# Patient Record
Sex: FEMALE | Race: WHITE | NOT HISPANIC OR LATINO
[De-identification: names, ages, dates, MRNs, and addresses within clinical notes are randomized per-mention and may not be internally consistent; named-entity substitution may affect disease eponyms.]

---

## 2017-02-09 PROBLEM — Z00.00 ENCOUNTER FOR PREVENTIVE HEALTH EXAMINATION: Noted: 2017-02-09

## 2017-02-13 ENCOUNTER — MEDICATION RENEWAL (OUTPATIENT)
Age: 47
End: 2017-02-13

## 2017-02-13 ENCOUNTER — APPOINTMENT (OUTPATIENT)
Dept: PULMONOLOGY | Facility: CLINIC | Age: 47
End: 2017-02-13

## 2017-02-13 VITALS
BODY MASS INDEX: 26.58 KG/M2 | HEIGHT: 63 IN | SYSTOLIC BLOOD PRESSURE: 126 MMHG | WEIGHT: 150 LBS | HEART RATE: 77 BPM | DIASTOLIC BLOOD PRESSURE: 78 MMHG | TEMPERATURE: 98 F | OXYGEN SATURATION: 100 %

## 2017-02-13 DIAGNOSIS — Z78.9 OTHER SPECIFIED HEALTH STATUS: ICD-10-CM

## 2017-02-13 DIAGNOSIS — Z86.32 PERSONAL HISTORY OF GESTATIONAL DIABETES: ICD-10-CM

## 2017-02-13 DIAGNOSIS — Z82.69 FAMILY HISTORY OF OTHER DISEASES OF THE MUSCULOSKELETAL SYSTEM AND CONNECTIVE TISSUE: ICD-10-CM

## 2017-02-13 DIAGNOSIS — Z87.09 PERSONAL HISTORY OF OTHER DISEASES OF THE RESPIRATORY SYSTEM: ICD-10-CM

## 2017-02-13 DIAGNOSIS — F17.200 NICOTINE DEPENDENCE, UNSPECIFIED, UNCOMPLICATED: ICD-10-CM

## 2017-02-13 DIAGNOSIS — Z87.39 PERSONAL HISTORY OF OTHER DISEASES OF THE MUSCULOSKELETAL SYSTEM AND CONNECTIVE TISSUE: ICD-10-CM

## 2017-02-13 DIAGNOSIS — M50.20 OTHER CERVICAL DISC DISPLACEMENT, UNSPECIFIED CERVICAL REGION: ICD-10-CM

## 2017-02-13 DIAGNOSIS — Z82.61 FAMILY HISTORY OF ARTHRITIS: ICD-10-CM

## 2017-02-13 PROBLEM — Z00.00 ENCOUNTER FOR PREVENTIVE HEALTH EXAMINATION: Noted: 2017-02-13

## 2017-02-13 RX ORDER — ALBUTEROL 90 MCG
90 AEROSOL (GRAM) INHALATION
Refills: 0 | Status: ACTIVE | COMMUNITY

## 2017-02-13 RX ORDER — MONTELUKAST SODIUM 10 MG/1
10 TABLET, FILM COATED ORAL
Qty: 1 | Refills: 1 | Status: ACTIVE | COMMUNITY
Start: 2017-02-13 | End: 1900-01-01

## 2017-04-24 ENCOUNTER — APPOINTMENT (OUTPATIENT)
Dept: PULMONOLOGY | Facility: CLINIC | Age: 47
End: 2017-04-24

## 2017-04-24 VITALS
HEART RATE: 77 BPM | SYSTOLIC BLOOD PRESSURE: 110 MMHG | HEIGHT: 63 IN | BODY MASS INDEX: 26.58 KG/M2 | WEIGHT: 150 LBS | DIASTOLIC BLOOD PRESSURE: 80 MMHG | RESPIRATION RATE: 17 BRPM | OXYGEN SATURATION: 98 %

## 2017-05-31 ENCOUNTER — EMERGENCY (EMERGENCY)
Facility: HOSPITAL | Age: 47
LOS: 1 days | Discharge: HOME | End: 2017-05-31
Attending: EMERGENCY MEDICINE | Admitting: EMERGENCY MEDICINE
Payer: COMMERCIAL

## 2017-05-31 VITALS
SYSTOLIC BLOOD PRESSURE: 138 MMHG | RESPIRATION RATE: 20 BRPM | HEIGHT: 63 IN | TEMPERATURE: 98 F | DIASTOLIC BLOOD PRESSURE: 90 MMHG | OXYGEN SATURATION: 99 % | HEART RATE: 66 BPM

## 2017-05-31 VITALS
SYSTOLIC BLOOD PRESSURE: 122 MMHG | DIASTOLIC BLOOD PRESSURE: 79 MMHG | TEMPERATURE: 98 F | OXYGEN SATURATION: 97 % | HEART RATE: 79 BPM | RESPIRATION RATE: 20 BRPM

## 2017-05-31 DIAGNOSIS — Z98.891 HISTORY OF UTERINE SCAR FROM PREVIOUS SURGERY: Chronic | ICD-10-CM

## 2017-05-31 LAB
ALBUMIN SERPL ELPH-MCNC: 4 G/DL — SIGNIFICANT CHANGE UP (ref 3.3–5)
ALP SERPL-CCNC: 65 U/L — SIGNIFICANT CHANGE UP (ref 40–120)
ALT FLD-CCNC: 12 U/L RC — SIGNIFICANT CHANGE UP (ref 10–45)
ANION GAP SERPL CALC-SCNC: 14 MMOL/L — SIGNIFICANT CHANGE UP (ref 5–17)
APTT BLD: 30 SEC — SIGNIFICANT CHANGE UP (ref 27.5–37.4)
AST SERPL-CCNC: 13 U/L — SIGNIFICANT CHANGE UP (ref 10–40)
BASOPHILS # BLD AUTO: 0.1 K/UL — SIGNIFICANT CHANGE UP (ref 0–0.2)
BASOPHILS NFR BLD AUTO: 0.8 % — SIGNIFICANT CHANGE UP (ref 0–2)
BILIRUB SERPL-MCNC: <0.1 MG/DL — LOW (ref 0.2–1.2)
BUN SERPL-MCNC: 11 MG/DL — SIGNIFICANT CHANGE UP (ref 7–23)
CALCIUM SERPL-MCNC: 9.2 MG/DL — SIGNIFICANT CHANGE UP (ref 8.4–10.5)
CHLORIDE SERPL-SCNC: 103 MMOL/L — SIGNIFICANT CHANGE UP (ref 96–108)
CK MB CFR SERPL CALC: 1.1 NG/ML — SIGNIFICANT CHANGE UP (ref 0–3.8)
CK SERPL-CCNC: 80 U/L — SIGNIFICANT CHANGE UP (ref 25–170)
CO2 SERPL-SCNC: 23 MMOL/L — SIGNIFICANT CHANGE UP (ref 22–31)
CREAT SERPL-MCNC: 0.87 MG/DL — SIGNIFICANT CHANGE UP (ref 0.5–1.3)
EOSINOPHIL # BLD AUTO: 0.3 K/UL — SIGNIFICANT CHANGE UP (ref 0–0.5)
EOSINOPHIL NFR BLD AUTO: 2.2 % — SIGNIFICANT CHANGE UP (ref 0–6)
GLUCOSE SERPL-MCNC: 108 MG/DL — HIGH (ref 70–99)
HCT VFR BLD CALC: 38.1 % — SIGNIFICANT CHANGE UP (ref 34.5–45)
HGB BLD-MCNC: 12.5 G/DL — SIGNIFICANT CHANGE UP (ref 11.5–15.5)
INR BLD: 1.01 RATIO — SIGNIFICANT CHANGE UP (ref 0.88–1.16)
LYMPHOCYTES # BLD AUTO: 28.6 % — SIGNIFICANT CHANGE UP (ref 13–44)
LYMPHOCYTES # BLD AUTO: 3.2 K/UL — SIGNIFICANT CHANGE UP (ref 1–3.3)
MCHC RBC-ENTMCNC: 28.5 PG — SIGNIFICANT CHANGE UP (ref 27–34)
MCHC RBC-ENTMCNC: 32.9 GM/DL — SIGNIFICANT CHANGE UP (ref 32–36)
MCV RBC AUTO: 86.4 FL — SIGNIFICANT CHANGE UP (ref 80–100)
MONOCYTES # BLD AUTO: 0.7 K/UL — SIGNIFICANT CHANGE UP (ref 0–0.9)
MONOCYTES NFR BLD AUTO: 6 % — SIGNIFICANT CHANGE UP (ref 2–14)
NEUTROPHILS # BLD AUTO: 7.1 K/UL — SIGNIFICANT CHANGE UP (ref 1.8–7.4)
NEUTROPHILS NFR BLD AUTO: 62.4 % — SIGNIFICANT CHANGE UP (ref 43–77)
PLATELET # BLD AUTO: 250 K/UL — SIGNIFICANT CHANGE UP (ref 150–400)
POTASSIUM SERPL-MCNC: 3.7 MMOL/L — SIGNIFICANT CHANGE UP (ref 3.5–5.3)
POTASSIUM SERPL-SCNC: 3.7 MMOL/L — SIGNIFICANT CHANGE UP (ref 3.5–5.3)
PROT SERPL-MCNC: 6.8 G/DL — SIGNIFICANT CHANGE UP (ref 6–8.3)
PROTHROM AB SERPL-ACNC: 11 SEC — SIGNIFICANT CHANGE UP (ref 9.8–12.7)
RBC # BLD: 4.4 M/UL — SIGNIFICANT CHANGE UP (ref 3.8–5.2)
RBC # FLD: 13 % — SIGNIFICANT CHANGE UP (ref 10.3–14.5)
SODIUM SERPL-SCNC: 140 MMOL/L — SIGNIFICANT CHANGE UP (ref 135–145)
TROPONIN T SERPL-MCNC: <0.01 NG/ML — SIGNIFICANT CHANGE UP (ref 0–0.06)
TROPONIN T SERPL-MCNC: <0.01 NG/ML — SIGNIFICANT CHANGE UP (ref 0–0.06)
WBC # BLD: 11.3 K/UL — HIGH (ref 3.8–10.5)
WBC # FLD AUTO: 11.3 K/UL — HIGH (ref 3.8–10.5)

## 2017-05-31 PROCEDURE — 85027 COMPLETE CBC AUTOMATED: CPT

## 2017-05-31 PROCEDURE — 99285 EMERGENCY DEPT VISIT HI MDM: CPT | Mod: 25

## 2017-05-31 PROCEDURE — 93010 ELECTROCARDIOGRAM REPORT: CPT

## 2017-05-31 PROCEDURE — 85730 THROMBOPLASTIN TIME PARTIAL: CPT

## 2017-05-31 PROCEDURE — 85610 PROTHROMBIN TIME: CPT

## 2017-05-31 PROCEDURE — 93005 ELECTROCARDIOGRAM TRACING: CPT

## 2017-05-31 PROCEDURE — 99284 EMERGENCY DEPT VISIT MOD MDM: CPT | Mod: 25

## 2017-05-31 PROCEDURE — 71046 X-RAY EXAM CHEST 2 VIEWS: CPT

## 2017-05-31 PROCEDURE — 84484 ASSAY OF TROPONIN QUANT: CPT

## 2017-05-31 PROCEDURE — 80053 COMPREHEN METABOLIC PANEL: CPT

## 2017-05-31 PROCEDURE — 96374 THER/PROPH/DIAG INJ IV PUSH: CPT

## 2017-05-31 PROCEDURE — 71020: CPT | Mod: 26

## 2017-05-31 PROCEDURE — 82553 CREATINE MB FRACTION: CPT

## 2017-05-31 PROCEDURE — 82550 ASSAY OF CK (CPK): CPT

## 2017-05-31 RX ORDER — ASPIRIN/CALCIUM CARB/MAGNESIUM 324 MG
162 TABLET ORAL ONCE
Qty: 0 | Refills: 0 | Status: DISCONTINUED | OUTPATIENT
Start: 2017-05-31 | End: 2017-06-04

## 2017-05-31 RX ORDER — KETOROLAC TROMETHAMINE 30 MG/ML
15 SYRINGE (ML) INJECTION ONCE
Qty: 0 | Refills: 0 | Status: DISCONTINUED | OUTPATIENT
Start: 2017-05-31 | End: 2017-05-31

## 2017-05-31 RX ORDER — SODIUM CHLORIDE 9 MG/ML
3 INJECTION INTRAMUSCULAR; INTRAVENOUS; SUBCUTANEOUS ONCE
Qty: 0 | Refills: 0 | Status: COMPLETED | OUTPATIENT
Start: 2017-05-31 | End: 2017-05-31

## 2017-05-31 RX ADMIN — SODIUM CHLORIDE 3 MILLILITER(S): 9 INJECTION INTRAMUSCULAR; INTRAVENOUS; SUBCUTANEOUS at 01:32

## 2017-05-31 RX ADMIN — Medication 15 MILLIGRAM(S): at 02:24

## 2017-05-31 NOTE — ED PROVIDER NOTE - PHYSICAL EXAMINATION
Constitutional: Alert, oriented, NAD  Eyes: PERRLA EOMI  HEENT: MMM, oropharynx non-erythematous, no exudate  Head: Normocephalic atraumatic  Cardiac: RRR, S1/S2, no MRG  Resp: Lungs CTAB  GI: Abd s/nt/nd, no rebound or guard  Neuro: CN2-12 intact  Skin: No rashes

## 2017-05-31 NOTE — ED ADULT NURSE NOTE - OBJECTIVE STATEMENT
47 y f came to the ed with right sided chest pain. patient states getting over a cold where she was coughing often. denies any sob. states the sob began around 4pm suddenly. denies any aggravating or relieving factors. skin is warm and dry. denies fevers, chills. lung sounds are clear bilaterally.

## 2017-05-31 NOTE — ED PROVIDER NOTE - ATTENDING CONTRIBUTION TO CARE
47F with pmh of asthma presents with R-sided chest pain, sudden onset while at rest. Continues to have discomfort. EKG with no acute changes. PERC negative.   plan to eval for acs with blood work, ekg, cxr. plan for two trop and cards f/u.

## 2017-05-31 NOTE — ED PROVIDER NOTE - CARE PLAN
Principal Discharge DX:	Chest pain Principal Discharge DX:	Chest pain  Instructions for follow-up, activity and diet:	Please follow-up with cardiology clinic within the next 2-3 days, please see form. Take aspirin, 81 mg once a day. Return immediately with any worsening or concerning symptoms.

## 2017-05-31 NOTE — ED PROVIDER NOTE - PLAN OF CARE
Please follow-up with cardiology clinic within the next 2-3 days, please see form. Take aspirin, 81 mg once a day. Return immediately with any worsening or concerning symptoms.

## 2017-05-31 NOTE — ED PROVIDER NOTE - NS ED ROS FT
Constitutional: No fever or chills  Eyes: No visual changes  HEENT: nasal congestion  CV: Chest pain  Resp: No SOB, cough  GI: No abd pain, nause or vomiting  : No dysuria  MSK: No musculoskeletal pain  Skin: No rash  Neuro: No headache

## 2017-05-31 NOTE — ED PROVIDER NOTE - MEDICAL DECISION MAKING DETAILS
47F with pmh of asthma presents with R-sided chest pain, sudden onset while at rest. Continues to have discomfort. EKG with no acute changes. PERC negative. HEART score with mod suspicious story 2 with neg troponin. Will delta trop, have f/u with cardiology if repeat troponin neg. - César Bashir MD

## 2017-05-31 NOTE — ED PROVIDER NOTE - OBJECTIVE STATEMENT
47F with pmh of asthma presents with R-sided chest pain beginning this afternoon. pt recently with URI symptoms, with cough, nasal congestion, x 1 1/2 weeks .cough productive of light yellow mucous. chest pain R pectoral region, constant, "sharp," improves somewhat with putting pressure on chest. non radiating. 8/10. pleuritic. worse with any movement. no sob. pt took tums without improvement in symptoms. no f/c. no n/v/d. no family hx of cardiac disease. no pain or swelling of calves. no GONZALEZ.

## 2017-08-16 ENCOUNTER — APPOINTMENT (OUTPATIENT)
Dept: PULMONOLOGY | Facility: CLINIC | Age: 47
End: 2017-08-16
Payer: COMMERCIAL

## 2017-08-16 VITALS
DIASTOLIC BLOOD PRESSURE: 65 MMHG | RESPIRATION RATE: 15 BRPM | HEIGHT: 63 IN | WEIGHT: 154 LBS | OXYGEN SATURATION: 100 % | BODY MASS INDEX: 27.29 KG/M2 | SYSTOLIC BLOOD PRESSURE: 120 MMHG | HEART RATE: 75 BPM

## 2017-08-16 PROCEDURE — 94010 BREATHING CAPACITY TEST: CPT

## 2017-08-16 PROCEDURE — 99214 OFFICE O/P EST MOD 30 MIN: CPT | Mod: 25

## 2017-08-16 PROCEDURE — 94727 GAS DIL/WSHOT DETER LNG VOL: CPT

## 2017-08-16 PROCEDURE — 94729 DIFFUSING CAPACITY: CPT

## 2017-09-07 ENCOUNTER — MEDICATION RENEWAL (OUTPATIENT)
Age: 47
End: 2017-09-07

## 2017-10-25 ENCOUNTER — RX RENEWAL (OUTPATIENT)
Age: 47
End: 2017-10-25

## 2017-12-13 ENCOUNTER — APPOINTMENT (OUTPATIENT)
Dept: PULMONOLOGY | Facility: CLINIC | Age: 47
End: 2017-12-13
Payer: COMMERCIAL

## 2017-12-13 VITALS
SYSTOLIC BLOOD PRESSURE: 90 MMHG | WEIGHT: 155 LBS | HEIGHT: 63 IN | RESPIRATION RATE: 14 BRPM | HEART RATE: 77 BPM | OXYGEN SATURATION: 100 % | DIASTOLIC BLOOD PRESSURE: 70 MMHG | BODY MASS INDEX: 27.46 KG/M2

## 2017-12-13 PROCEDURE — 94010 BREATHING CAPACITY TEST: CPT

## 2017-12-13 PROCEDURE — 99406 BEHAV CHNG SMOKING 3-10 MIN: CPT

## 2017-12-13 PROCEDURE — 99214 OFFICE O/P EST MOD 30 MIN: CPT | Mod: 25

## 2017-12-13 PROCEDURE — 99070 SPECIAL SUPPLIES PHYS/QHP: CPT

## 2018-03-27 ENCOUNTER — APPOINTMENT (OUTPATIENT)
Dept: PULMONOLOGY | Facility: CLINIC | Age: 48
End: 2018-03-27
Payer: COMMERCIAL

## 2018-03-27 VITALS
SYSTOLIC BLOOD PRESSURE: 126 MMHG | DIASTOLIC BLOOD PRESSURE: 70 MMHG | HEART RATE: 67 BPM | WEIGHT: 155 LBS | HEIGHT: 63 IN | OXYGEN SATURATION: 96 % | BODY MASS INDEX: 27.46 KG/M2

## 2018-03-27 PROCEDURE — 94618 PULMONARY STRESS TESTING: CPT

## 2018-03-27 PROCEDURE — 99214 OFFICE O/P EST MOD 30 MIN: CPT | Mod: 25

## 2018-03-27 PROCEDURE — 71046 X-RAY EXAM CHEST 2 VIEWS: CPT

## 2018-03-27 RX ORDER — ALBUTEROL SULFATE 2.5 MG/3ML
(2.5 MG/3ML) SOLUTION RESPIRATORY (INHALATION)
Qty: 120 | Refills: 5 | Status: ACTIVE | COMMUNITY
Start: 2018-03-27 | End: 1900-01-01

## 2018-03-27 RX ORDER — DAPSONE 50 MG/G
5 GEL TOPICAL
Qty: 60 | Refills: 0 | Status: ACTIVE | COMMUNITY
Start: 2018-03-26

## 2018-05-18 ENCOUNTER — RX RENEWAL (OUTPATIENT)
Age: 48
End: 2018-05-18

## 2018-05-18 RX ORDER — ALBUTEROL SULFATE 90 UG/1
108 (90 BASE) AEROSOL, METERED RESPIRATORY (INHALATION)
Qty: 8.5 | Refills: 1 | Status: ACTIVE | COMMUNITY
Start: 2016-12-28 | End: 1900-01-01

## 2018-06-26 ENCOUNTER — EMERGENCY (EMERGENCY)
Facility: HOSPITAL | Age: 48
LOS: 1 days | Discharge: HOME | End: 2018-06-26
Attending: EMERGENCY MEDICINE
Payer: COMMERCIAL

## 2018-06-26 ENCOUNTER — APPOINTMENT (OUTPATIENT)
Dept: PULMONOLOGY | Facility: CLINIC | Age: 48
End: 2018-06-26

## 2018-06-26 VITALS
SYSTOLIC BLOOD PRESSURE: 133 MMHG | WEIGHT: 153 LBS | TEMPERATURE: 98 F | HEART RATE: 86 BPM | OXYGEN SATURATION: 99 % | RESPIRATION RATE: 20 BRPM | DIASTOLIC BLOOD PRESSURE: 75 MMHG

## 2018-06-26 VITALS
OXYGEN SATURATION: 100 % | HEART RATE: 60 BPM | RESPIRATION RATE: 16 BRPM | SYSTOLIC BLOOD PRESSURE: 103 MMHG | DIASTOLIC BLOOD PRESSURE: 64 MMHG

## 2018-06-26 DIAGNOSIS — Z98.891 HISTORY OF UTERINE SCAR FROM PREVIOUS SURGERY: Chronic | ICD-10-CM

## 2018-06-26 LAB
ALBUMIN SERPL ELPH-MCNC: 4.4 G/DL — SIGNIFICANT CHANGE UP (ref 3.3–5)
ALP SERPL-CCNC: 75 U/L — SIGNIFICANT CHANGE UP (ref 40–120)
ALT FLD-CCNC: 11 U/L — SIGNIFICANT CHANGE UP (ref 10–45)
ANION GAP SERPL CALC-SCNC: 14 MMOL/L — SIGNIFICANT CHANGE UP (ref 5–17)
AST SERPL-CCNC: 11 U/L — SIGNIFICANT CHANGE UP (ref 10–40)
BASOPHILS # BLD AUTO: 0.1 K/UL — SIGNIFICANT CHANGE UP (ref 0–0.2)
BASOPHILS NFR BLD AUTO: 0.7 % — SIGNIFICANT CHANGE UP (ref 0–2)
BILIRUB SERPL-MCNC: 0.2 MG/DL — SIGNIFICANT CHANGE UP (ref 0.2–1.2)
BUN SERPL-MCNC: 10 MG/DL — SIGNIFICANT CHANGE UP (ref 7–23)
CALCIUM SERPL-MCNC: 9.7 MG/DL — SIGNIFICANT CHANGE UP (ref 8.4–10.5)
CHLORIDE SERPL-SCNC: 103 MMOL/L — SIGNIFICANT CHANGE UP (ref 96–108)
CO2 SERPL-SCNC: 24 MMOL/L — SIGNIFICANT CHANGE UP (ref 22–31)
CREAT SERPL-MCNC: 0.77 MG/DL — SIGNIFICANT CHANGE UP (ref 0.5–1.3)
EOSINOPHIL # BLD AUTO: 0.2 K/UL — SIGNIFICANT CHANGE UP (ref 0–0.5)
EOSINOPHIL NFR BLD AUTO: 2 % — SIGNIFICANT CHANGE UP (ref 0–6)
GLUCOSE SERPL-MCNC: 111 MG/DL — HIGH (ref 70–99)
HCT VFR BLD CALC: 41 % — SIGNIFICANT CHANGE UP (ref 34.5–45)
HGB BLD-MCNC: 13.7 G/DL — SIGNIFICANT CHANGE UP (ref 11.5–15.5)
LYMPHOCYTES # BLD AUTO: 2.6 K/UL — SIGNIFICANT CHANGE UP (ref 1–3.3)
LYMPHOCYTES # BLD AUTO: 25.6 % — SIGNIFICANT CHANGE UP (ref 13–44)
MCHC RBC-ENTMCNC: 29.1 PG — SIGNIFICANT CHANGE UP (ref 27–34)
MCHC RBC-ENTMCNC: 33.4 GM/DL — SIGNIFICANT CHANGE UP (ref 32–36)
MCV RBC AUTO: 87 FL — SIGNIFICANT CHANGE UP (ref 80–100)
MONOCYTES # BLD AUTO: 0.6 K/UL — SIGNIFICANT CHANGE UP (ref 0–0.9)
MONOCYTES NFR BLD AUTO: 6.2 % — SIGNIFICANT CHANGE UP (ref 2–14)
NEUTROPHILS # BLD AUTO: 6.7 K/UL — SIGNIFICANT CHANGE UP (ref 1.8–7.4)
NEUTROPHILS NFR BLD AUTO: 65.5 % — SIGNIFICANT CHANGE UP (ref 43–77)
PLATELET # BLD AUTO: 236 K/UL — SIGNIFICANT CHANGE UP (ref 150–400)
POTASSIUM SERPL-MCNC: 3.6 MMOL/L — SIGNIFICANT CHANGE UP (ref 3.5–5.3)
POTASSIUM SERPL-SCNC: 3.6 MMOL/L — SIGNIFICANT CHANGE UP (ref 3.5–5.3)
PROT SERPL-MCNC: 7.2 G/DL — SIGNIFICANT CHANGE UP (ref 6–8.3)
RBC # BLD: 4.71 M/UL — SIGNIFICANT CHANGE UP (ref 3.8–5.2)
RBC # FLD: 12.9 % — SIGNIFICANT CHANGE UP (ref 10.3–14.5)
SODIUM SERPL-SCNC: 141 MMOL/L — SIGNIFICANT CHANGE UP (ref 135–145)
TROPONIN T, HIGH SENSITIVITY RESULT: <6 NG/L — SIGNIFICANT CHANGE UP (ref 0–51)
WBC # BLD: 10.2 K/UL — SIGNIFICANT CHANGE UP (ref 3.8–10.5)
WBC # FLD AUTO: 10.2 K/UL — SIGNIFICANT CHANGE UP (ref 3.8–10.5)

## 2018-06-26 PROCEDURE — 93010 ELECTROCARDIOGRAM REPORT: CPT

## 2018-06-26 PROCEDURE — 96374 THER/PROPH/DIAG INJ IV PUSH: CPT

## 2018-06-26 PROCEDURE — 84484 ASSAY OF TROPONIN QUANT: CPT

## 2018-06-26 PROCEDURE — 85027 COMPLETE CBC AUTOMATED: CPT

## 2018-06-26 PROCEDURE — 80053 COMPREHEN METABOLIC PANEL: CPT

## 2018-06-26 PROCEDURE — 99284 EMERGENCY DEPT VISIT MOD MDM: CPT | Mod: 25

## 2018-06-26 PROCEDURE — 93005 ELECTROCARDIOGRAM TRACING: CPT

## 2018-06-26 RX ORDER — ACETAMINOPHEN 500 MG
1000 TABLET ORAL ONCE
Qty: 0 | Refills: 0 | Status: COMPLETED | OUTPATIENT
Start: 2018-06-26 | End: 2018-06-26

## 2018-06-26 RX ORDER — SODIUM CHLORIDE 9 MG/ML
1000 INJECTION INTRAMUSCULAR; INTRAVENOUS; SUBCUTANEOUS ONCE
Qty: 0 | Refills: 0 | Status: COMPLETED | OUTPATIENT
Start: 2018-06-26 | End: 2018-06-26

## 2018-06-26 RX ADMIN — Medication 400 MILLIGRAM(S): at 21:31

## 2018-06-26 RX ADMIN — SODIUM CHLORIDE 1000 MILLILITER(S): 9 INJECTION INTRAMUSCULAR; INTRAVENOUS; SUBCUTANEOUS at 21:31

## 2018-06-26 NOTE — ED ADULT NURSE NOTE - CHPI ED SYMPTOMS NEG
no blurred vision/no confusion/no numbness/no fever/no vomiting/no change in level of consciousness/no loss of consciousness

## 2018-06-26 NOTE — ED PROVIDER NOTE - OBJECTIVE STATEMENT
47 yo F, current everyday smoker, hx cervical disc herniation, presenting with c/o dizziness, headache and gait instability. Pt reports episode  dizziness with sudden onset upon standing from desk at work yesterday that she describes as light headedness- denies room/head spinning, with associated nausea, "heaviness" in head and difficulty walking straight. Pt states she attempted to walk to restroom but went straight back to desk and put her head down for 1.5 hrs with continued symptoms, and associated R hand and arm tingling, denies cp/sob at that time. Pt reports intermittent b/l frontal headache in the last two days, no provoking factors, did not take meds for alleviation. No history of migraines, pt states she has had more severe headaches than this in the past. One episode of dizziness while driving today.

## 2018-06-26 NOTE — ED PROVIDER NOTE - NS ED ROS FT
CONST: no fevers, no chills, no trauma  EYES: no pain, no visual disturbances  ENT: no sore throat, no epistaxis, no rhinorrhea, no hearing changes  CV: no chest pain, no palpitations, no orthopnea, no extremity pain or swelling  RESP: no shortness of breath, no cough, no sputum, no pleurisy, no wheezing  ABD: no abdominal pain, no nausea, no vomiting, no diarrhea, no black or bloody stool  : no dysuria, no hematuria, no frequency, no urgency  MSK: no back pain, no neck pain, no extremity pain  NEURO: +headache, dizziness  HEME: no easy bleeding or bruising  SKIN: no diaphoresis, no rash
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
## 2018-06-26 NOTE — ED PROVIDER NOTE - PROGRESS NOTE DETAILS
Dakota, PGY1: pt reports continued dizziness, now with headache, no cp/sob/palpitations, reports increased bleeding 20 minutes ago when she went to restroom. Discussion to transfuse, will consent pt reports improved symptoms, u preg and d/c

## 2018-06-26 NOTE — ED PROVIDER NOTE - ATTENDING CONTRIBUTION TO CARE
Patient presenting for evaluation of feeling lightheaded with standing yesterday, associated with nausea at that time (resolved), heavy sensation of the head (but not pain).  Improved with putting her head down on her desk.  Felt off balance at the time.  Symptoms resolved during the day yesterday, had repeat episode today (now resolved) - presenting for evaluation.    On exam patient well appearing, vital signs within normal limits, RRR S1/S2, lungs clear to ascultation bilaterally, neurologically intact, ambulating without difficulty.    Patient presentation seems more consistent with near syncope/orthostasis, normal neurologic exam makes central cause of symptoms seems less likely, plan for screening labs, EKG, CE, re-evaluate.

## 2018-06-26 NOTE — ED PROVIDER NOTE - MEDICAL DECISION MAKING DETAILS
47 yo F smoker, cervical disc hernation presenting s/p pre-syncopal episode with gait ataxia, unremarkable neuro exam, ekg, cbc, cmp, trop, orthostatics, and reassess, likely d/c with neuro f/u

## 2018-06-28 ENCOUNTER — APPOINTMENT (OUTPATIENT)
Dept: NEUROLOGY | Facility: CLINIC | Age: 48
End: 2018-06-28

## 2018-08-23 ENCOUNTER — RX RENEWAL (OUTPATIENT)
Age: 48
End: 2018-08-23

## 2018-09-25 ENCOUNTER — RX RENEWAL (OUTPATIENT)
Age: 48
End: 2018-09-25

## 2018-10-02 ENCOUNTER — APPOINTMENT (OUTPATIENT)
Dept: PULMONOLOGY | Facility: CLINIC | Age: 48
End: 2018-10-02
Payer: COMMERCIAL

## 2018-10-02 VITALS
BODY MASS INDEX: 28 KG/M2 | DIASTOLIC BLOOD PRESSURE: 70 MMHG | HEIGHT: 63 IN | RESPIRATION RATE: 17 BRPM | WEIGHT: 158 LBS | SYSTOLIC BLOOD PRESSURE: 116 MMHG | OXYGEN SATURATION: 98 % | HEART RATE: 85 BPM

## 2018-10-02 PROBLEM — M54.5 LOW BACK PAIN: Chronic | Status: ACTIVE | Noted: 2017-05-31

## 2018-10-02 PROBLEM — J45.909 UNSPECIFIED ASTHMA, UNCOMPLICATED: Chronic | Status: ACTIVE | Noted: 2017-05-31

## 2018-10-02 PROBLEM — M50.20 OTHER CERVICAL DISC DISPLACEMENT, UNSPECIFIED CERVICAL REGION: Chronic | Status: ACTIVE | Noted: 2017-05-31

## 2018-10-02 PROCEDURE — 99213 OFFICE O/P EST LOW 20 MIN: CPT

## 2018-10-17 ENCOUNTER — APPOINTMENT (OUTPATIENT)
Dept: PULMONOLOGY | Facility: CLINIC | Age: 48
End: 2018-10-17
Payer: COMMERCIAL

## 2018-10-17 ENCOUNTER — NON-APPOINTMENT (OUTPATIENT)
Age: 48
End: 2018-10-17

## 2018-10-17 VITALS
DIASTOLIC BLOOD PRESSURE: 60 MMHG | OXYGEN SATURATION: 98 % | BODY MASS INDEX: 28 KG/M2 | WEIGHT: 158 LBS | HEIGHT: 63 IN | SYSTOLIC BLOOD PRESSURE: 110 MMHG | RESPIRATION RATE: 17 BRPM | HEART RATE: 82 BPM

## 2018-10-17 PROCEDURE — 94010 BREATHING CAPACITY TEST: CPT

## 2018-10-17 PROCEDURE — 99213 OFFICE O/P EST LOW 20 MIN: CPT | Mod: 25

## 2018-10-17 RX ORDER — CETIRIZINE HYDROCHLORIDE 10 MG/1
10 TABLET ORAL
Qty: 30 | Refills: 0 | Status: ACTIVE | COMMUNITY
Start: 2018-10-17 | End: 1900-01-01

## 2018-11-13 ENCOUNTER — APPOINTMENT (OUTPATIENT)
Dept: PULMONOLOGY | Facility: CLINIC | Age: 48
End: 2018-11-13
Payer: COMMERCIAL

## 2018-11-13 ENCOUNTER — NON-APPOINTMENT (OUTPATIENT)
Age: 48
End: 2018-11-13

## 2018-11-13 VITALS
SYSTOLIC BLOOD PRESSURE: 130 MMHG | OXYGEN SATURATION: 97 % | HEART RATE: 79 BPM | DIASTOLIC BLOOD PRESSURE: 80 MMHG | BODY MASS INDEX: 28.7 KG/M2 | HEIGHT: 63 IN | WEIGHT: 162 LBS

## 2018-11-13 PROCEDURE — 94010 BREATHING CAPACITY TEST: CPT

## 2018-11-13 PROCEDURE — 99214 OFFICE O/P EST MOD 30 MIN: CPT | Mod: 25

## 2018-11-13 RX ORDER — AZITHROMYCIN 250 MG/1
250 TABLET, FILM COATED ORAL
Qty: 6 | Refills: 0 | Status: DISCONTINUED | COMMUNITY
Start: 2018-03-27 | End: 2018-11-13

## 2018-11-13 RX ORDER — AZITHROMYCIN 250 MG/1
250 TABLET, FILM COATED ORAL
Qty: 1 | Refills: 0 | Status: DISCONTINUED | COMMUNITY
Start: 2018-10-02 | End: 2018-11-13

## 2018-11-13 RX ORDER — PREDNISONE 10 MG/1
10 TABLET ORAL
Qty: 21 | Refills: 0 | Status: DISCONTINUED | COMMUNITY
Start: 2018-10-02 | End: 2018-11-13

## 2018-11-13 RX ORDER — LEVOCETIRIZINE DIHYDROCHLORIDE 5 MG/1
5 TABLET ORAL
Qty: 1 | Refills: 1 | Status: ACTIVE | COMMUNITY
Start: 2018-11-13 | End: 1900-01-01

## 2018-11-13 RX ORDER — FLUTICASONE FUROATE, UMECLIDINIUM BROMIDE AND VILANTEROL TRIFENATATE 100; 62.5; 25 UG/1; UG/1; UG/1
100-62.5-25 POWDER RESPIRATORY (INHALATION)
Qty: 3 | Refills: 1 | Status: ACTIVE | COMMUNITY
Start: 2018-11-13 | End: 1900-01-01

## 2018-11-13 RX ORDER — FLUTICASONE FUROATE AND VILANTEROL TRIFENATATE 200; 25 UG/1; UG/1
200-25 POWDER RESPIRATORY (INHALATION) DAILY
Qty: 1 | Refills: 3 | Status: DISCONTINUED | COMMUNITY
Start: 2017-02-13 | End: 2018-11-13

## 2018-11-13 RX ORDER — PREDNISONE 20 MG/1
20 TABLET ORAL
Qty: 10 | Refills: 0 | Status: DISCONTINUED | COMMUNITY
Start: 2018-03-27 | End: 2018-11-13

## 2018-11-13 NOTE — HISTORY OF PRESENT ILLNESS
[FreeTextEntry1] : Ms. Mars is a 48 year old female presenting to the office for a follow up visit s/p asthmatic bronchitis, allergic rhinitis, asthma, nicotine addiction, and shortness of breath. Her chief complaint is lingering cough\par - She continues to cough though it has improved. She reports that it has always been with her. She describes it as a lingering cough.\par - She produced slightly discolored sputum (yellow) in the morning. \par - She states that she has cut down to half a pack of cigarettes \par - She denies any headaches, nausea, vomiting, fever, chills, sweats, chest pain, chest pressure, palpitations, SOB, coughing, wheezing, fatigue, diarrhea, constipation, dysphagia, myalgias, dizziness, leg swelling, leg pain, itchy eyes, itchy ears, heartburn, reflux, or sour taste in the mouth.

## 2018-11-13 NOTE — PHYSICAL EXAM

## 2018-11-13 NOTE — PROCEDURE
[FreeTextEntry1] : PFT- spi reveals normal flows; FEV1 was 2.50L which is 91% of predicted; normal flow volume loop

## 2018-11-13 NOTE — ASSESSMENT
[FreeTextEntry1] : Ms. Mars has a history of coasthma, allergy, and GERD. She is currently recovering from an acute bronchitis asthmatic bronchitis and is still smoking (noncompliant).\par \par Her shortness of breath is multifactorial due to:\par -moderate persistent asthma\par -poor breathing mechanics\par -deconditioning \par -overweight\par \par Problem 1: moderate persistent asthma/ Co Asthma\par - Continue Albuterol nebulizer at least BID up to QiD (gargle and spit after use)\par - Transition to Trelegy 1 inhalation QD\par -continue to use Ventolin PRN and before exercise\par -continue to use Singulair 10 mg before bed \par -Asthma is believed to be caused by inherited (genetic) and environmental factor, but its exact cause is unknown. Asthma may be triggered by allergens, lung infections, or irritants in the air. Asthma triggers are different for each person\par -Inhaler technique reviewed as well as oral hygiene techniques reviewed with patient. Avoidance of cold air, extremes of temperature, rescue inhaler should be used before exercise. Order of medication reviewed with patient. Recommended use of a cool mist humidifier in the bedroom.\par \par problem 2: smoking cessation\par -acapella device for mucus clearance  \par -Discussed for five minutes with the patient the risks/associations with continued smoking including COPD, emphysema, shortness of breath, renal cancer, bladder cancer, stroke risk, cardiac disease, etc. Smoking cessation was discussed at length and highly encouraged. Various options to aid cessation was discussed including use of Chantix, Nicotrol, nicotine products, laser therapy, hypnosis, Wellbutrin, etc.\par problem 3: allergic rhinitis\par -continue to use nasal saline PRN\par -continue Xyzal 5 mg before bed \par -Environmental measures for allergies were encouraged including mattress and pillow cover, air purifier, and environmental controls. \par \par problem 4: poor breathing mechanics\par -Proper breathing techniques were reviewed with an emphasis of exhalation. Patient instructed to breath in for 1 second and out for four seconds. Patient was encouraged to not talk while walking.\par \par problem 5: overweight/deconditioning\par -recommended yoga and wellness visits\par -Weight loss, exercise, and diet control were discussed and are highly encouraged. Treatment options were given such as, aqua therapy, and contacting a nutritionist. Recommended to use the elliptical, stationary bike, less use of treadmill. Obesity is associated with worsening asthma, shortness of breath, and potential for cardiac disease, diabetes, and other underlying medical conditions. \par \par problem 7: constipation\par -recommended to use Dr. Lomax's Intestinal Formula #1 \par \par problem 8: health maintenance \par -recommended yearly flu shot after October 15, refused \par -recommended strep pneumonia vaccines: Prevnar-13 vaccine, followed by Pneumo vaccine 23 one year following\par -recommended early intervention for URIs\par -recommended regular osteoporosis evaluations\par -recommended early dermatological evaluations\par -recommended after the age of 50 to the age of 70, colonoscopy every 5 years \par \par \par F/U in 3 months\par She is encouraged to call with any changes, concerns, or questions.

## 2018-11-13 NOTE — ADDENDUM
[FreeTextEntry1] : Documented by Larry Pang acting as a scribe for Dr. Jose Miguel Francisco on 11/13/18\par \par All medical record entries made by the Scribe were at my, Dr. Jose Miguel Francisco's, direction and personally dictated by me on 11/13/18. I have reviewed the chart and agree that the record accurately reflects my personal performance of the history, physical exam, assessment and plan. I have also personally directed, reviewed, and agree with the discharge instructions. \par \par \par \par \par

## 2018-11-13 NOTE — REASON FOR VISIT
[Follow-Up] : a follow-up visit [FreeTextEntry1] : s/p asthmatic bronchitis, allergic rhinitis, asthma, nicotine addiction, and shortness of breath

## 2019-03-19 ENCOUNTER — APPOINTMENT (OUTPATIENT)
Dept: PULMONOLOGY | Facility: CLINIC | Age: 49
End: 2019-03-19
Payer: COMMERCIAL

## 2019-03-19 ENCOUNTER — NON-APPOINTMENT (OUTPATIENT)
Age: 49
End: 2019-03-19

## 2019-03-19 VITALS
HEART RATE: 77 BPM | SYSTOLIC BLOOD PRESSURE: 120 MMHG | OXYGEN SATURATION: 98 % | DIASTOLIC BLOOD PRESSURE: 70 MMHG | RESPIRATION RATE: 17 BRPM | HEIGHT: 62 IN | WEIGHT: 160 LBS | BODY MASS INDEX: 29.44 KG/M2

## 2019-03-19 DIAGNOSIS — J20.9 ACUTE BRONCHITIS, UNSPECIFIED: ICD-10-CM

## 2019-03-19 DIAGNOSIS — J45.901 ACUTE BRONCHITIS, UNSPECIFIED: ICD-10-CM

## 2019-03-19 PROCEDURE — 99214 OFFICE O/P EST MOD 30 MIN: CPT | Mod: 25

## 2019-03-19 PROCEDURE — 94010 BREATHING CAPACITY TEST: CPT

## 2019-03-19 PROCEDURE — 99406 BEHAV CHNG SMOKING 3-10 MIN: CPT

## 2019-03-19 RX ORDER — MELOXICAM 15 MG/1
15 TABLET ORAL
Qty: 30 | Refills: 0 | Status: ACTIVE | COMMUNITY
Start: 2018-11-29

## 2019-03-19 RX ORDER — PREDNISONE 10 MG/1
10 TABLET ORAL
Qty: 50 | Refills: 0 | Status: DISCONTINUED | COMMUNITY
Start: 2018-11-13 | End: 2019-03-19

## 2019-03-19 NOTE — PHYSICAL EXAM
[General Appearance - Well Developed] : well developed [Normal Appearance] : normal appearance [Well Groomed] : well groomed [General Appearance - Well Nourished] : well nourished [No Deformities] : no deformities [General Appearance - In No Acute Distress] : no acute distress [Normal Conjunctiva] : the conjunctiva exhibited no abnormalities [Eyelids - No Xanthelasma] : the eyelids demonstrated no xanthelasmas [Normal Oropharynx] : normal oropharynx [II] : II [Neck Appearance] : the appearance of the neck was normal [Neck Cervical Mass (___cm)] : no neck mass was observed [Jugular Venous Distention Increased] : there was no jugular-venous distention [Thyroid Diffuse Enlargement] : the thyroid was not enlarged [Thyroid Nodule] : there were no palpable thyroid nodules [Heart Rate And Rhythm] : heart rate and rhythm were normal [Heart Sounds] : normal S1 and S2 [Murmurs] : no murmurs present [Respiration, Rhythm And Depth] : normal respiratory rhythm and effort [Exaggerated Use Of Accessory Muscles For Inspiration] : no accessory muscle use [Auscultation Breath Sounds / Voice Sounds] : lungs were clear to auscultation bilaterally [Abdomen Soft] : soft [Abdomen Tenderness] : non-tender [Abnormal Walk] : normal gait [Abdomen Mass (___ Cm)] : no abdominal mass palpated [Gait - Sufficient For Exercise Testing] : the gait was sufficient for exercise testing [Cyanosis, Localized] : no localized cyanosis [Nail Clubbing] : no clubbing of the fingernails [Petechial Hemorrhages (___cm)] : no petechial hemorrhages [Skin Color & Pigmentation] : normal skin color and pigmentation [] : no rash [No Venous Stasis] : no venous stasis [Skin Lesions] : no skin lesions [No Skin Ulcers] : no skin ulcer [No Xanthoma] : no  xanthoma was observed [Deep Tendon Reflexes (DTR)] : deep tendon reflexes were 2+ and symmetric [Sensation] : the sensory exam was normal to light touch and pinprick [Oriented To Time, Place, And Person] : oriented to person, place, and time [No Focal Deficits] : no focal deficits [Impaired Insight] : insight and judgment were intact [Affect] : the affect was normal [FreeTextEntry1] : I:E 1:3, clear

## 2019-03-19 NOTE — ASSESSMENT
[FreeTextEntry1] : Ms. Mars has a history of coasthma, allergy, and GERD. She continues to actively smoke (noncompliant). \par \par Her shortness of breath is multifactorial due to:\par -moderate persistent asthma\par -poor breathing mechanics\par -deconditioning \par -overweight\par \par Problem 1: moderate persistent asthma/ Co Asthma\par - Continue Albuterol nebulizer at least BID up to QiD (gargle and spit after use)\par - Continue Trelegy 1 inhalation QD\par -continue to use Ventolin PRN and before exercise (receiving generic)\par -continue to use Singulair 10 mg before bed \par - Script given for prednisone to be used in case of *emergency*; pt was instructed to consult with the office *before* taking any prednisone \par -Asthma is believed to be caused by inherited (genetic) and environmental factor, but its exact cause is unknown. Asthma may be triggered by allergens, lung infections, or irritants in the air. Asthma triggers are different for each person\par -Inhaler technique reviewed as well as oral hygiene techniques reviewed with patient. Avoidance of cold air, extremes of temperature, rescue inhaler should be used before exercise. Order of medication reviewed with patient. Recommended use of a cool mist humidifier in the bedroom.\par \par problem 2: smoking cessation\par -acapella device for mucus clearance  \par -Discussed for five minutes with the patient the risks/associations with continued smoking including COPD, emphysema, shortness of breath, renal cancer, bladder cancer, stroke risk, cardiac disease, etc. Smoking cessation was discussed at length and highly encouraged. Various options to aid cessation was discussed including use of Chantix, Nicotrol, nicotine products, laser therapy, hypnosis, Wellbutrin, etc.\par \par problem 3: allergic rhinitis\par -continue to use nasal saline PRN\par -continue Xyzal 5 mg before bed \par -Environmental measures for allergies were encouraged including mattress and pillow cover, air purifier, and environmental controls. \par \par problem 4: poor breathing mechanics\par -Proper breathing techniques were reviewed with an emphasis of exhalation. Patient instructed to breath in for 1 second and out for four seconds. Patient was encouraged to not talk while walking.\par \par problem 5: overweight/deconditioning\par -recommended yoga and wellness visits\par -Weight loss, exercise, and diet control were discussed and are highly encouraged. Treatment options were given such as, aqua therapy, and contacting a nutritionist. Recommended to use the elliptical, stationary bike, less use of treadmill. Obesity is associated with worsening asthma, shortness of breath, and potential for cardiac disease, diabetes, and other underlying medical conditions. \par \par problem 7: constipation\par -recommended to use Dr. Lomax's Intestinal Formula #1 \par \par problem 8: health maintenance \par -recommended yearly flu shot after October 15, refused \par -recommended strep pneumonia vaccines: Prevnar-13 vaccine, followed by Pneumo vaccine 23 one year following\par -recommended early intervention for URIs\par -recommended regular osteoporosis evaluations\par -recommended early dermatological evaluations\par -recommended after the age of 50 to the age of 70, colonoscopy every 5 years \par \par \par F/U in 4 months\par She is encouraged to call with any changes, concerns, or questions.

## 2019-03-19 NOTE — REASON FOR VISIT
[Follow-Up] : a follow-up visit [FreeTextEntry1] : allergic rhinitis, asthma, nicotine addiction, and shortness of breath

## 2019-03-19 NOTE — HISTORY OF PRESENT ILLNESS
[FreeTextEntry1] : Ms. Mars is a 49 year old female presenting to the office for a follow up visit s/p asthmatic bronchitis, allergic rhinitis, asthma, nicotine addiction, and shortness of breath. Her chief complaint is allergies. \par - She comes in stating that she feels poorly as she has been dealing with allergies\par - She has been forgetting to take her allergy medication\par - Her inhaler was changed from Ventolin to ProAir, which she has not been benefiting from. She has been developing a scratchy throat in response. She is scheduled to receive an alternative\par - She reports chest pain/pressure when being noncompliant with her inhalers\par - Her sinuses have been active \par - She continues to smoke. She states that her smoking keeps her up. \par - She sleeps 3-4 hours/night\par - Her constipation remains unchanged. She has not obtained Dr. Weston

## 2019-03-19 NOTE — PROCEDURE
[FreeTextEntry1] : PFT- spi reveals normal flows; FEV1 was 2.43L which is 92% of predicted; flattened inspiratory limb.

## 2019-03-19 NOTE — ADDENDUM
[FreeTextEntry1] : Documented by Larry Pang acting as a scribe for Dr. Jose Miguel Francisco on 3/19/2019\par \par All medical record entries made by the Scribe were at my, Dr. Jose Miguel Francisco's, direction and personally dictated by me on 3/19/2019. I have reviewed the chart and agree that the record accurately reflects my personal performance of the history, physical exam, assessment and plan. I have also personally directed, reviewed, and agree with the discharge instructions. \par \par \par \par \par

## 2019-08-06 NOTE — ED PROVIDER NOTE - CONDITION AT DISCHARGE:
Improved [Consultation] : a consultation regarding [Spouse] : spouse [FreeTextEntry1] : heart failure

## 2019-08-26 ENCOUNTER — MEDICATION RENEWAL (OUTPATIENT)
Age: 49
End: 2019-08-26

## 2019-11-05 ENCOUNTER — APPOINTMENT (OUTPATIENT)
Dept: PULMONOLOGY | Facility: CLINIC | Age: 49
End: 2019-11-05
Payer: COMMERCIAL

## 2019-11-05 ENCOUNTER — NON-APPOINTMENT (OUTPATIENT)
Age: 49
End: 2019-11-05

## 2019-11-05 VITALS
BODY MASS INDEX: 30.55 KG/M2 | OXYGEN SATURATION: 97 % | HEART RATE: 87 BPM | WEIGHT: 166 LBS | HEIGHT: 62 IN | RESPIRATION RATE: 16 BRPM | SYSTOLIC BLOOD PRESSURE: 120 MMHG | DIASTOLIC BLOOD PRESSURE: 80 MMHG

## 2019-11-05 PROCEDURE — 94010 BREATHING CAPACITY TEST: CPT

## 2019-11-05 PROCEDURE — 99214 OFFICE O/P EST MOD 30 MIN: CPT | Mod: 25

## 2019-11-05 PROCEDURE — 95012 NITRIC OXIDE EXP GAS DETER: CPT

## 2019-11-05 NOTE — HISTORY OF PRESENT ILLNESS
[FreeTextEntry1] : Ms. Mars is a 49 year old female presenting to the office for a follow up visit s/p asthmatic bronchitis, allergic rhinitis, asthma, nicotine addiction, and shortness of breath. Her chief complaint is allergies and congestion. \par \par - Using a mouth guard and was going to use it because she grinds her teeth at night \par - She still has allergies and believes that her allergies are not in control. Certain things makes her more congested than others. \par -Patient is smoking a pack a day \par - Pt denies snoring \par - Pt states she is sleeping little with about 4 hours a night. \par - Pt uses the Singulair at night \par - Denies SOB\par - Denies exercising regularly \par \par denies any  chest pain, chest pressure, diarrhea, constipation, dysphagia, dizziness, leg swelling, leg pain, itchy eyes, itchy ears, heartburn, reflux, or sour taste in the mouth.\par

## 2019-11-05 NOTE — ADDENDUM
[FreeTextEntry1] : Documented by Samuel Etienne acting as a scribe for Dr. Jose Miguel Francisco on 11/05/2019 \par \par All medical record entries made by the Scribe were at my, Dr. Jose Miguel Francisco's, direction and personally dictated by me on 11/05/2019 . I have reviewed the chart and agree that the record accurately reflects my personal performance of the history, physical exam, assessment and plan. I have also personally directed, reviewed, and agree with the discharge instructions.\par

## 2019-11-05 NOTE — PHYSICAL EXAM
[General Appearance - Well Developed] : well developed [Normal Appearance] : normal appearance [Well Groomed] : well groomed [General Appearance - Well Nourished] : well nourished [No Deformities] : no deformities [General Appearance - In No Acute Distress] : no acute distress [Normal Conjunctiva] : the conjunctiva exhibited no abnormalities [Eyelids - No Xanthelasma] : the eyelids demonstrated no xanthelasmas [Normal Oropharynx] : normal oropharynx [III] : III [Neck Appearance] : the appearance of the neck was normal [Neck Cervical Mass (___cm)] : no neck mass was observed [Jugular Venous Distention Increased] : there was no jugular-venous distention [Thyroid Diffuse Enlargement] : the thyroid was not enlarged [Thyroid Nodule] : there were no palpable thyroid nodules [Heart Rate And Rhythm] : heart rate and rhythm were normal [Heart Sounds] : normal S1 and S2 [Murmurs] : no murmurs present [Respiration, Rhythm And Depth] : normal respiratory rhythm and effort [Exaggerated Use Of Accessory Muscles For Inspiration] : no accessory muscle use [Auscultation Breath Sounds / Voice Sounds] : lungs were clear to auscultation bilaterally [Abdomen Soft] : soft [Abdomen Tenderness] : non-tender [Abdomen Mass (___ Cm)] : no abdominal mass palpated [Abnormal Walk] : normal gait [Gait - Sufficient For Exercise Testing] : the gait was sufficient for exercise testing [Nail Clubbing] : no clubbing of the fingernails [Cyanosis, Localized] : no localized cyanosis [Petechial Hemorrhages (___cm)] : no petechial hemorrhages [Skin Color & Pigmentation] : normal skin color and pigmentation [] : no rash [No Venous Stasis] : no venous stasis [Skin Lesions] : no skin lesions [No Skin Ulcers] : no skin ulcer [No Xanthoma] : no  xanthoma was observed [Deep Tendon Reflexes (DTR)] : deep tendon reflexes were 2+ and symmetric [Sensation] : the sensory exam was normal to light touch and pinprick [No Focal Deficits] : no focal deficits [Oriented To Time, Place, And Person] : oriented to person, place, and time [Impaired Insight] : insight and judgment were intact [Affect] : the affect was normal [FreeTextEntry1] : I:E 1:3, clear

## 2019-11-05 NOTE — ASSESSMENT
[FreeTextEntry1] : Ms. Mars has a history of coasthma, allergy, and GERD. She continues to actively smoke (noncompliant). #1 complaint is allergy. \par \par Her shortness of breath is multifactorial due to:\par -moderate persistent asthma\par -poor breathing mechanics\par -deconditioning \par -overweight\par \par Problem 1: moderate persistent asthma/ Co Asthma\par - Continue Albuterol nebulizer at least BID up to QiD (gargle and spit after use)\par - Continue Trelegy 1 inhalation QD\par -continue to use Ventolin PRN and before exercise (receiving generic)\par -continue to use Singulair 10 mg before bed \par - Script given for prednisone to be used in case of *emergency*; pt was instructed to consult with the office *before* taking any prednisone \par -Asthma is believed to be caused by inherited (genetic) and environmental factor, but its exact cause is unknown. Asthma may be triggered by allergens, lung infections, or irritants in the air. Asthma triggers are different for each person\par -Inhaler technique reviewed as well as oral hygiene techniques reviewed with patient. Avoidance of cold air, extremes of temperature, rescue inhaler should be used before exercise. Order of medication reviewed with patient. Recommended use of a cool mist humidifier in the bedroom.\par \par problem 2: smoking cessation\par -acapella device for mucus clearance  \par -Discussed for five minutes with the patient the risks/associations with continued smoking including COPD, emphysema, shortness of breath, renal cancer, bladder cancer, stroke risk, cardiac disease, etc. Smoking cessation was discussed at length and highly encouraged. Various options to aid cessation was discussed including use of Chantix, Nicotrol, nicotine products, laser therapy, hypnosis, Wellbutrin, etc.\par \par problem 3: allergic rhinitis\par -continue to use nasal saline PRN\par -add Clarinex 5 mg QAM\par -continue Xyzal 5 mg before bed \par -Environmental measures for allergies were encouraged including mattress and pillow cover, air purifier, and environmental controls. \par \par problem 4: poor breathing mechanics\par -Proper breathing techniques were reviewed with an emphasis of exhalation. Patient instructed to breath in for 1 second and out for four seconds. Patient was encouraged to not talk while walking.\par \par problem 5: overweight/deconditioning\par -recommended yoga and wellness visits\par -Weight loss, exercise, and diet control were discussed and are highly encouraged. Treatment options were given such as, aqua therapy, and contacting a nutritionist. Recommended to use the elliptical, stationary bike, less use of treadmill. Obesity is associated with worsening asthma, shortness of breath, and potential for cardiac disease, diabetes, and other underlying medical conditions. \par \par problem 7: constipation\par -recommended to use Dr. Lomax's Intestinal Formula #1 \par \par problem 8: health maintenance \par -recommended yearly flu shot after October 15, refused \par -recommended strep pneumonia vaccines: Prevnar-13 vaccine, followed by Pneumo vaccine 23 one year following\par -recommended early intervention for URIs\par -recommended regular osteoporosis evaluations\par -recommended early dermatological evaluations\par -recommended after the age of 50 to the age of 70, colonoscopy every 5 years \par \par \par F/U in 4 months with Spi \par She is encouraged to call with any changes, concerns, or questions.

## 2019-11-05 NOTE — PROCEDURE
[FreeTextEntry1] : FENO was  less than 5 ; a normal value being less than 25 \par \par Fractional exhaled nitric oxide (FENO) is regarded as a simple, noninvasive method for assessing eosinophilic airway inflammation. Produced by a variety of cells within the lung, nitric oxide (NO) concentrations are generally low in healthy individuals. However, high concentrations of NO appear to be involved in nonspecific host defense mechanisms and chronic inflammatory diseases such as asthma. The American Thoracic Society (ATS) therefore has recommended using FENO to aid in the diagnosis and monitoring of eosinophilic airway inflammation and asthma, and for identifying steroid responsive individuals whose chronic respiratory symptoms may be caused by airway inflammation.\par \par \par PFT revealed normal flows, with a FEV1 of 2.30 L, which is 87% of predicted, with a normal flow volume loop. \par \par

## 2020-04-13 ENCOUNTER — RX RENEWAL (OUTPATIENT)
Age: 50
End: 2020-04-13

## 2020-05-12 ENCOUNTER — APPOINTMENT (OUTPATIENT)
Dept: PULMONOLOGY | Facility: CLINIC | Age: 50
End: 2020-05-12

## 2020-08-08 ENCOUNTER — TRANSCRIPTION ENCOUNTER (OUTPATIENT)
Age: 50
End: 2020-08-08

## 2020-08-08 ENCOUNTER — RESULT REVIEW (OUTPATIENT)
Age: 50
End: 2020-08-08

## 2020-08-08 ENCOUNTER — EMERGENCY (EMERGENCY)
Facility: HOSPITAL | Age: 50
LOS: 1 days | Discharge: HOME | End: 2020-08-08
Attending: EMERGENCY MEDICINE
Payer: COMMERCIAL

## 2020-08-08 VITALS
SYSTOLIC BLOOD PRESSURE: 139 MMHG | OXYGEN SATURATION: 99 % | RESPIRATION RATE: 18 BRPM | HEART RATE: 96 BPM | TEMPERATURE: 98 F | DIASTOLIC BLOOD PRESSURE: 77 MMHG

## 2020-08-08 VITALS
SYSTOLIC BLOOD PRESSURE: 176 MMHG | HEART RATE: 100 BPM | TEMPERATURE: 98 F | DIASTOLIC BLOOD PRESSURE: 86 MMHG | WEIGHT: 154.98 LBS | HEIGHT: 63 IN | RESPIRATION RATE: 19 BRPM | OXYGEN SATURATION: 99 %

## 2020-08-08 DIAGNOSIS — Z98.891 HISTORY OF UTERINE SCAR FROM PREVIOUS SURGERY: Chronic | ICD-10-CM

## 2020-08-08 PROCEDURE — 73110 X-RAY EXAM OF WRIST: CPT

## 2020-08-08 PROCEDURE — 99284 EMERGENCY DEPT VISIT MOD MDM: CPT

## 2020-08-08 PROCEDURE — 99283 EMERGENCY DEPT VISIT LOW MDM: CPT

## 2020-08-08 PROCEDURE — 73110 X-RAY EXAM OF WRIST: CPT | Mod: 26,LT

## 2020-08-08 RX ORDER — CYCLOBENZAPRINE HYDROCHLORIDE 10 MG/1
10 TABLET, FILM COATED ORAL ONCE
Refills: 0 | Status: COMPLETED | OUTPATIENT
Start: 2020-08-08 | End: 2020-08-08

## 2020-08-08 RX ORDER — IBUPROFEN 200 MG
600 TABLET ORAL ONCE
Refills: 0 | Status: COMPLETED | OUTPATIENT
Start: 2020-08-08 | End: 2020-08-08

## 2020-08-08 RX ADMIN — Medication 600 MILLIGRAM(S): at 19:27

## 2020-08-08 RX ADMIN — CYCLOBENZAPRINE HYDROCHLORIDE 10 MILLIGRAM(S): 10 TABLET, FILM COATED ORAL at 20:24

## 2020-08-08 RX ADMIN — Medication 600 MILLIGRAM(S): at 20:24

## 2020-08-08 NOTE — ED PROVIDER NOTE - PATIENT PORTAL LINK FT
You can access the FollowMyHealth Patient Portal offered by [facility] by registering at the following website: [email or web address]. By joining [employer]’s FollowMyHealth portal, you will also be able to view your health information using other applications (apps) compatible with our system.
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
## 2020-08-08 NOTE — ED PROVIDER NOTE - CARE PLAN
Principal Discharge DX:	Muscle spasm  Goal:	Left wrist Principal Discharge DX:	Wrist pain, acute, left  Goal:	Left wrist

## 2020-08-08 NOTE — ED PROVIDER NOTE - ATTENDING CONTRIBUTION TO CARE
Fern Danielle MD - Attending Physician: I have personally seen and examined this patient with the resident/fellow.  I have fully participated in the care of this patient. I have reviewed all pertinent clinical information, including history, physical exam, plan and the Resident/Fellow’s note and agree except as noted. See MDM

## 2020-08-08 NOTE — ED PROVIDER NOTE - PRO INTERPRETER NEED 2
Reviewed discharge instructions with patient.  Patient verbalized understanding and agreed with plan of care.    Patient said he will speak with his pulmonologist the switch inhaler (stop Proair and start Xoponex).   English

## 2020-08-08 NOTE — ED ADULT TRIAGE NOTE - IDEAL BODY WEIGHT(KG)
Future Appointments   Date Time Provider Anupam Barlow   4/9/2020  2:30 PM Sabina Menezes [facility]     LOV 3/25/2019
52

## 2020-08-08 NOTE — ED PROVIDER NOTE - PROGRESS NOTE DETAILS
Derek Ernst MD:  Results discussed with patient, reassessed. Stable. Derek Ernst MD:  Patient's L. wrist placed in splint, need for physical therapy and continued motion expressed and verbalized by patient. States improved pain while in splint.  Shared decision making used to assess trial of muscle relaxant, patient states interest. Provided cyclobenzaprine.

## 2020-08-08 NOTE — ED ADULT NURSE NOTE - OBJECTIVE STATEMENT
49 y/o female c/o L wrist pain since yesterday. PMH of carpal tunnel. Pt reports pain began yesterday, denies any injury or trauma. Pain is sharp, worse when moving it. Pt has stabilized wrist since pain began yesterday. +strong equal radial pulses bilaterally, no redness, slight swelling to L wrist. Sensation intact L hand and wrist. Pt became tearful when moving wrist. Took naproxen w/o relief.

## 2020-08-08 NOTE — ED PROVIDER NOTE - CLINICAL SUMMARY MEDICAL DECISION MAKING FREE TEXT BOX
50 Y f h/o disk herniation, presenting with immobilization of L. wrist.  Likely muscle spasm with no continguous paresthesia, full range of motion, no erythema over carpal site. No loss of pulses and capillary filling. Xray, likely DC 50 Y f h/o disk herniation, presenting with immobilization of L. wrist.  Likely muscle spasm with no continguous paresthesia, full range of motion, no erythema over carpal site. No loss of pulses and capillary filling. Xray, likely DC    Fern Danielle MD - Attending Physician: Pt here with L wrist/hand pain, dorsal proximal hand, noted spasm, held in flexion. No swelling, warmth, redness. No neuro deficits. No finger involvement. Not concern for infection. Possible strain vs CTS vs other. Xr, pain control, f/u with hand

## 2020-08-08 NOTE — ED PROVIDER NOTE - NSFOLLOWUPINSTRUCTIONS_ED_ALL_ED_FT
You presented with wrist pain, make sure to keep your wrist active removing it from the splint and providing active motion, use warm compresses as tolerated. You were provided flexeril, a muscle spasm medication, do not drive, operate heavy machinery, or shower afterwards as it can cause drowsiness. Take Motrin 600mg every 8hrs with food for pain. Please followup with Dr. Juan F Swann ([phone]) [facility]. Please return for worsening paresthesia, severe pain, or worsening symptoms.

## 2020-08-08 NOTE — ED PROVIDER NOTE - NS ED ROS FT
GENERAL: No fever or chills  EYES: No change in vision  HEENT: No trouble swallowing or speaking  CARDIAC: No chest pain  PULMONARY: No cough or SOB  GI: No abdominal pain, no nausea or no vomiting, no diarrhea or constipation  : No changes in urination  SKIN: No rashes  NEURO: No headache, + paresthesia over L dorsal carpal  MSK: + L. carpal pain  Otherwise as HPI or negative.

## 2020-08-08 NOTE — ED PROVIDER NOTE - PHYSICAL EXAMINATION
PHYSICAL EXAM:  GENERAL: NAD, well-developed  HEAD:  Atraumatic, Normocephalic  EYES: EOMI, PERRLA, conjunctiva and sclera clear  NECK: Supple, No JVD  CHEST/LUNG: Clear to auscultation bilaterally; No wheeze  HEART: Regular rate and rhythm; No murmurs, rubs, or gallops  ABDOMEN: Soft, Nontender, Nondistended; Bowel sounds present  EXTREMITIES:  2+ Peripheral Pulses, No clubbing, cyanosis, or edema  PSYCH: AAOx3  NEUROLOGY: non-focal, paresthesia across L. dorsal carpal. Full range of motion on distal fingers, difficulty with wrist extension, flexion, radial or ulnar deviation., full sensation at distal fingertips, no scaphoid tenderness, no tenderness across distal radius or ulna  SKIN: No rashes or lesions PHYSICAL EXAM:  GENERAL: NAD, well-developed  HEAD:  Atraumatic, Normocephalic  EYES: EOMI, PERRLA, conjunctiva and sclera clear  NECK: Supple, No JVD  CHEST/LUNG: Clear to auscultation bilaterally; No wheeze  HEART: Regular rate and rhythm; No murmurs, rubs, or gallops  ABDOMEN: Soft, Nontender, Nondistended; Bowel sounds present  EXTREMITIES:  2+ Peripheral Pulses, No clubbing, cyanosis, or edema. Full range of motion on distal fingers, difficulty with wrist extension, flexion, radial or ulnar deviation due to pain. No swelling, redness, or warmth  PSYCH: AAOx3  NEUROLOGY: No objective sensory deficits. Full sensation at distal fingertips, no scaphoid tenderness, no tenderness across distal radius or ulna  SKIN: No rashes or lesions

## 2020-08-10 ENCOUNTER — APPOINTMENT (OUTPATIENT)
Dept: ORTHOPEDIC SURGERY | Facility: CLINIC | Age: 50
End: 2020-08-10
Payer: COMMERCIAL

## 2020-08-10 VITALS — TEMPERATURE: 98.3 F

## 2020-08-10 VITALS
SYSTOLIC BLOOD PRESSURE: 118 MMHG | WEIGHT: 155 LBS | DIASTOLIC BLOOD PRESSURE: 75 MMHG | HEIGHT: 62 IN | HEART RATE: 85 BPM | BODY MASS INDEX: 28.52 KG/M2

## 2020-08-10 DIAGNOSIS — M51.26 OTHER INTERVERTEBRAL DISC DISPLACEMENT, LUMBAR REGION: ICD-10-CM

## 2020-08-10 PROCEDURE — 99204 OFFICE O/P NEW MOD 45 MIN: CPT

## 2020-08-10 RX ORDER — LEVOCETIRIZINE DIHYDROCHLORIDE 5 MG/1
TABLET, FILM COATED ORAL
Refills: 0 | Status: ACTIVE | COMMUNITY

## 2020-08-10 NOTE — REVIEW OF SYSTEMS
[Right] : right [Joint Pain] : joint pain [Arthralgia] : arthralgia [Joint Swelling] : joint swelling [Joint Stiffness] : joint stiffness [Negative] : Allergic/Immunologic

## 2020-08-10 NOTE — DISCUSSION/SUMMARY
[FreeTextEntry1] : Patient was informed of the findings he was told she has cellulitis of the left hand and wrist advised on appropriate activity modification warm soaks and starting Keflex 500 mg 3 times a day.  She will follow-up within 48 to 72 hours

## 2020-08-10 NOTE — HISTORY OF PRESENT ILLNESS
[Right] : right hand dominant [FreeTextEntry1] : Pt presents for initial evaluation with pain in her left wrist. Pt is right hand dominant. There is no known injury. Pt states she went to [facility] 8/8/2020  xray taken and to [facility] 8/8/2020  xray taken. Pt has taken Flexeril.  Pt is wearing a  short arm Velcro splint to the left wrist and a sling. Pt left wrist and hand is edematous and there is erythremia.

## 2020-08-10 NOTE — PHYSICAL EXAM
[de-identified] :  examination of the left wrist and hand discloses mild soft tissue swelling erythema noted to the dorsum of the left hand and wrist.  There is acute tenderness to attempted passive motion of the wrist and fingers with significant stiffness [de-identified] : X-rays of the left hand and wrist were reviewed: Nonspecific

## 2020-08-14 ENCOUNTER — APPOINTMENT (OUTPATIENT)
Dept: ORTHOPEDIC SURGERY | Facility: CLINIC | Age: 50
End: 2020-08-14
Payer: COMMERCIAL

## 2020-08-14 VITALS
DIASTOLIC BLOOD PRESSURE: 71 MMHG | BODY MASS INDEX: 28.52 KG/M2 | HEIGHT: 62 IN | SYSTOLIC BLOOD PRESSURE: 108 MMHG | OXYGEN SATURATION: 97 % | HEART RATE: 89 BPM | WEIGHT: 155 LBS

## 2020-08-14 PROCEDURE — 99214 OFFICE O/P EST MOD 30 MIN: CPT

## 2020-08-14 NOTE — REVIEW OF SYSTEMS
[Right] : right [Joint Stiffness] : joint stiffness [Arthralgia] : arthralgia [Negative] : Endocrine

## 2020-08-14 NOTE — DISCUSSION/SUMMARY
[FreeTextEntry1] : Patient's condition is improving and she has continued to take her Keflex apply moist heat and warm soaks\par \par She will complete her antibiotics with the expectation of complete resolution of the symptoms and follow-up on an as-needed basis

## 2020-08-14 NOTE — HISTORY OF PRESENT ILLNESS
[Right] : right hand dominant [FreeTextEntry1] : Pt returns for follow up for her left wrist swelling. Pt is taking Keflex and is using ice.  Pt reminded to use warm wet soaks.

## 2020-08-14 NOTE — PHYSICAL EXAM
[de-identified] : Emanation of the left upper extremity discloses resolving erythema to the dorsum of the left hand.  Minimal tenderness is present

## 2020-09-04 NOTE — ED PROCEDURE NOTE - ATTENDING CONTRIBUTION TO CARE
Attending MD Fern Danielle: Risks, benefit and alternatives of procedure explained to patient and patient demonstrated verbal understanding and consent.  I was present during the key portions of the procedure. Patient tolerated procedure well without complications
Attending MD Fern Danielle: Risks, benefit and alternatives of procedure explained to patient and patient demonstrated verbal understanding and consent.  I was present during the key portions of the procedure. Patient tolerated procedure well without complications

## 2020-09-04 NOTE — ED PROCEDURE NOTE - PROCEDURE ADDITIONAL DETAILS
Emergency Department Focused Ultrasound performed at patient's bedside for educational purposes. The study will have a follow up wrist study performed.

## 2020-09-08 ENCOUNTER — RX RENEWAL (OUTPATIENT)
Age: 50
End: 2020-09-08

## 2020-10-16 ENCOUNTER — RX RENEWAL (OUTPATIENT)
Age: 50
End: 2020-10-16

## 2020-12-21 ENCOUNTER — RX RENEWAL (OUTPATIENT)
Age: 50
End: 2020-12-21

## 2021-01-07 ENCOUNTER — APPOINTMENT (OUTPATIENT)
Dept: PULMONOLOGY | Facility: CLINIC | Age: 51
End: 2021-01-07
Payer: COMMERCIAL

## 2021-01-07 VITALS — HEIGHT: 62 IN | WEIGHT: 144 LBS | BODY MASS INDEX: 26.5 KG/M2

## 2021-01-07 PROCEDURE — 99406 BEHAV CHNG SMOKING 3-10 MIN: CPT | Mod: 95

## 2021-01-07 PROCEDURE — 99215 OFFICE O/P EST HI 40 MIN: CPT | Mod: 25,95

## 2021-01-07 RX ORDER — LEVOCETIRIZINE DIHYDROCHLORIDE 5 MG/1
5 TABLET ORAL
Qty: 90 | Refills: 2 | Status: ACTIVE | COMMUNITY
Start: 2017-04-24 | End: 1900-01-01

## 2021-01-07 RX ORDER — FLUTICASONE FUROATE, UMECLIDINIUM BROMIDE AND VILANTEROL TRIFENATATE 100; 62.5; 25 UG/1; UG/1; UG/1
100-62.5-25 POWDER RESPIRATORY (INHALATION)
Qty: 3 | Refills: 0 | Status: ACTIVE | COMMUNITY
Start: 2021-01-07 | End: 1900-01-01

## 2021-01-07 NOTE — HISTORY OF PRESENT ILLNESS
[Home] : at home, [unfilled] , at the time of the visit. [Medical Office: ([facility])___] : at the medical office located in  [Verbal consent obtained from patient] : the patient, [unfilled] [FreeTextEntry1] : Ms. Mars is a 51 year old female presenting to the office for a follow up visit s/p asthmatic bronchitis, allergic rhinitis, asthma, nicotine addiction, and shortness of breath. Her chief complaint is \par -She notes having a positive covid-19 test \par -She notes developing a cough 1/1/2020\par -She notes seeing her PCP and getting a covid-19 test on 1/4/2020 and getting the results 1/6/2020\par -She notes her oxygen levels being 98% on 1/4/2020 where her PCP recommended to go to [place] to get MAB \par -She notes her breathing is clear \par -She notes only an occasional cough \par -She notes taking 10mg of prednisone on 1/5/2020 but not taking every day \par -She notes her sinuses are congested occasionally \par -She is still smoking \par -She notes only using he rescue inhaler \par \par - denies any headaches, nausea, vomiting, fever, chills, sweats, chest pain, chest pressure, diarrhea, constipation, dysphagia, dizziness, leg swelling, leg pain, itchy eyes, itchy ears, heartburn, reflux, or sour taste in the mouth. \par \par

## 2021-01-07 NOTE — ADDENDUM
[FreeTextEntry1] : Documented by Baltazar Daíz acting as a scribe for Dr. Jose Miguel Francisco on (01/07/2021).\par \par All medical record entries made by the Scribe were at my, Dr. Jose Miguel Francisco's, direction and personally dictated by me on (01/07/2021). I have reviewed the chart and agree that the record accurately reflects my personal performance of the history, physical exam, assessment and plan. I have also personally directed, reviewed, and agree with the discharge instructions.

## 2021-01-07 NOTE — HISTORY OF PRESENT ILLNESS
[Home] : at home, [unfilled] , at the time of the visit. [Medical Office: ([facility])___] : at the medical office located in  [Verbal consent obtained from patient] : the patient, [unfilled] [FreeTextEntry1] : Ms. Mars is a 51 year old female presenting to the office for a follow up visit s/p asthmatic bronchitis, allergic rhinitis, asthma, nicotine addiction, and shortness of breath. Her chief complaint is \par -She notes having a positive covid-19 test \par -She notes developing a cough 1/1/2020\par -She notes seeing her PCP and getting a covid-19 test on 1/4/2020 and getting the results 1/6/2020\par -She notes her oxygen levels being 98% on 1/4/2020 where her PCP recommended to go to [place] to get MAB \par -She notes her breathing is clear \par -She notes only an occasional cough \par -She notes taking 10mg of prednisone on 1/5/2020 but not taking every day \par -She notes her sinuses are congested occasionally \par -She is still smoking \par -She notes only using he rescue inhaler \par \par - denies any headaches, nausea, vomiting, fever, chills, sweats, chest pain, chest pressure, diarrhea, constipation, dysphagia, dizziness, leg swelling, leg pain, itchy eyes, itchy ears, heartburn, reflux, or sour taste in the mouth. \par \par

## 2021-01-07 NOTE — ASSESSMENT
[FreeTextEntry1] : Ms. Mars is a 51 year old female who has a history of coasthma, allergy, and GERD. She continues to actively smoke (noncompliant). #1 complaint is allergy. She is positive for Covid19 infection.\par \par Her shortness of breath is multifactorial due to:\par -moderate persistent asthma\par -poor breathing mechanics\par -deconditioning \par -overweight\par \par Problem 1A: Covid 19 infection\par -Not candidate for MAB \par -OTC Vitamin C 500mg BID \par -OTC Quercetin 250-500mg BID \par -OTC Zinc 75-100mg per day \par -OTC Melatonin 1 or 2mg a night \par -OTC Vitamin D 1-4000mg per day \par -OTC Tonic Water 8oz per day\par -Water 0.5-1 gallon per day\par  \par \par Problem 1: moderate persistent asthma/ Co Asthma (active )\par - Continue Albuterol nebulizer at least BID up to QiD (gargle and spit after use)\par - Continue Trelegy 1 inhalation \par -continue to use Ventolin PRN and before exercise (receiving generic)\par -continue to use Singulair 10 mg before bed \par - Script given for prednisone to be used in case of *emergency*; pt was instructed to consult with the office *before* taking any prednisone \par -Asthma is believed to be caused by inherited (genetic) and environmental factor, but its exact cause is unknown. Asthma may be triggered by allergens, lung infections, or irritants in the air. Asthma triggers are different for each person\par -Inhaler technique reviewed as well as oral hygiene techniques reviewed with patient. Avoidance of cold air, extremes of temperature, rescue inhaler should be used before exercise. Order of medication reviewed with patient. Recommended use of a cool mist humidifier in the bedroom.\par \par problem 2: smoking cessation (1/7/2021)\par -acapella device for mucus clearance  \par -Discussed for five minutes with the patient the risks/associations with continued smoking including COPD, emphysema, shortness of breath, renal cancer, bladder cancer, stroke risk, cardiac disease, etc. Smoking cessation was discussed at length and highly encouraged. Various options to aid cessation was discussed including use of Chantix, Nicotrol, nicotine products, laser therapy, hypnosis, Wellbutrin, etc.\par \par problem 3: allergic rhinitis\par -continue to use nasal saline PRN\par -add Clarinex 5 mg QAM\par -continue Xyzal 5 mg before bed \par -Environmental measures for allergies were encouraged including mattress and pillow cover, air purifier, and environmental controls. \par \par problem 4: poor breathing mechanics\par -Proper breathing techniques were reviewed with an emphasis of exhalation. Patient instructed to breath in for 1 second and out for four seconds. Patient was encouraged to not talk while walking.\par \par problem 5: overweight/deconditioning\par -recommended yoga and wellness visits\par -Weight loss, exercise, and diet control were discussed and are highly encouraged. Treatment options were given such as, aqua therapy, and contacting a nutritionist. Recommended to use the elliptical, stationary bike, less use of treadmill. Obesity is associated with worsening asthma, shortness of breath, and potential for cardiac disease, diabetes, and other underlying medical conditions. \par \par problem 7: constipation\par -recommended to use Dr. Lomax's Intestinal Formula #1 \par \par problem 8: health maintenance \par -recommended yearly flu shot after October 15, refused \par -recommended strep pneumonia vaccines: Prevnar-13 vaccine, followed by Pneumo vaccine 23 one year following\par -recommended early intervention for URIs\par -recommended regular osteoporosis evaluations\par -recommended early dermatological evaluations\par -recommended after the age of 50 to the age of 70, colonoscopy every 5 years \par \par \par F/U in 4 months with Spi \par She is encouraged to call with any changes, concerns, or questions.

## 2021-01-07 NOTE — ADDENDUM
[FreeTextEntry1] : Documented by Baltazar Díaz acting as a scribe for Dr. Jose Miguel Francisco on (01/07/2021).\par \par All medical record entries made by the Scribe were at my, Dr. Jose Miguel Francisco's, direction and personally dictated by me on (01/07/2021). I have reviewed the chart and agree that the record accurately reflects my personal performance of the history, physical exam, assessment and plan. I have also personally directed, reviewed, and agree with the discharge instructions.

## 2021-01-11 ENCOUNTER — APPOINTMENT (OUTPATIENT)
Dept: PULMONOLOGY | Facility: CLINIC | Age: 51
End: 2021-01-11

## 2021-04-29 ENCOUNTER — NON-APPOINTMENT (OUTPATIENT)
Age: 51
End: 2021-04-29

## 2021-04-29 ENCOUNTER — APPOINTMENT (OUTPATIENT)
Dept: PULMONOLOGY | Facility: CLINIC | Age: 51
End: 2021-04-29
Payer: COMMERCIAL

## 2021-04-29 VITALS
WEIGHT: 163 LBS | BODY MASS INDEX: 30 KG/M2 | DIASTOLIC BLOOD PRESSURE: 64 MMHG | SYSTOLIC BLOOD PRESSURE: 140 MMHG | TEMPERATURE: 97.6 F | RESPIRATION RATE: 16 BRPM | HEART RATE: 89 BPM | OXYGEN SATURATION: 98 % | HEIGHT: 62 IN

## 2021-04-29 PROCEDURE — 94010 BREATHING CAPACITY TEST: CPT

## 2021-04-29 PROCEDURE — 95012 NITRIC OXIDE EXP GAS DETER: CPT

## 2021-04-29 PROCEDURE — 99072 ADDL SUPL MATRL&STAF TM PHE: CPT

## 2021-04-29 PROCEDURE — 99406 BEHAV CHNG SMOKING 3-10 MIN: CPT | Mod: 25

## 2021-04-29 PROCEDURE — 99214 OFFICE O/P EST MOD 30 MIN: CPT | Mod: 25

## 2021-04-29 RX ORDER — CARBINOXAMINE MALEATE 6 MG/1
6 TABLET ORAL
Qty: 90 | Refills: 1 | Status: ACTIVE | COMMUNITY
Start: 2021-04-29 | End: 1900-01-01

## 2021-04-29 RX ORDER — PREDNISONE 10 MG/1
10 TABLET ORAL
Qty: 50 | Refills: 0 | Status: DISCONTINUED | COMMUNITY
Start: 2021-01-07 | End: 2021-04-29

## 2021-04-29 RX ORDER — PREDNISONE 10 MG/1
10 TABLET ORAL
Qty: 50 | Refills: 0 | Status: DISCONTINUED | COMMUNITY
Start: 2019-03-19 | End: 2021-04-29

## 2021-04-29 NOTE — HISTORY OF PRESENT ILLNESS
[FreeTextEntry1] : Ms. Mars is a 51 year old female presenting to the office for a follow up visit s/p asthmatic bronchitis, allergic rhinitis, asthma, nicotine addiction, and shortness of breath. Her chief complaint is \par -she notes her allergies are currently active due to the allergy season\par -she notes feeling good in general\par -she denies sniffling all the time\par -she notes some sinus HA\par -she notes poor sleep pattern\par -she notes she does not get enough sleep\par -she notes getting 3-4 hours per night\par -she notes her work is busy but she likes it\par -she notes she smokes a pack a day\par -she notes she lost weight and then gained weight, overall stable\par -she notes gaining weight due to eating too many sweets\par -she notes taking Singulair, Advair, rescue inhaler\par -she denies taking the maintenance inhaler\par -she notes taking Xyzal\par -she notes not liking to take the nasal spray\par -she notes not wanting the vaccine until it has been out for one year\par \par patient denies any headaches, nausea, vomiting, fever, chills, sweats, chest pain, chest pressure, palpitations, coughing, wheezing, fatigue, diarrhea, constipation, dysphagia, myalgias, dizziness, leg swelling, leg pain, itchy eyes, itchy ears, heartburn, reflux or sour taste in the mouth

## 2021-04-29 NOTE — ADDENDUM
[FreeTextEntry1] : Documented by Marques Howard acting as a scribe for Dr. Jose Miguel Francisco on (04/29/2021).\par \par All medical record entries made by the Scribe were at my, Dr. Jose Miguel Francisco's, direction and personally dictated by me on (04/29/2021). I have reviewed the chart and agree that the record accurately reflects my personal performance of the history, physical exam, assessment and plan. I have also personally directed, reviewed, and agree with the discharge instructions.\par

## 2021-04-29 NOTE — PROCEDURE
[FreeTextEntry1] : Feno was 5; a normal value being less than 25. Fractional exhaled nitric oxide (FENO) is regarded as a simple, noninvasive method for assessing eosinophilic airway inflammation. Produced by a variety of cells within the lung, nitric oxide (NO) concentrations are generally low in healthy individuals. However, high concentrations of NO appear to be involved in nonspecific host defense mechanisms and chronic inflammatory  diseases such as asthma. The American Thoracic Society (ATS) therefore recommended using FENO to aid in the diagnosis and monitoring of eosinophilic airway inflammation and asthma, and for identifying steroid responsive individuals whose chronic respiratory symptoms may be caused by airway inflammation \par \par PFT revealed normal flows, with a FEV1 of 2.33L, which is 90% of predicted, with a normal flow volume loop\par \par -Images and procedures reviewed in detail and discussed with patient. \par

## 2021-04-29 NOTE — ASSESSMENT
[FreeTextEntry1] : Ms. Mars is a 51 year old female who has a history of coasthma, allergy, and GERD. She comes to the office s/p COVID-19. She continues to actively smoke (noncompliant). #1 complaint is allergy (still), still smoking\par \par Her shortness of breath is multifactorial due to:\par -moderate persistent asthma\par -poor breathing mechanics\par -deconditioning \par -overweight\par \par Problem 1A: Covid 19 infection\par -Not candidate for MAB \par -OTC Vitamin C 500mg BID \par -OTC Quercetin 250-500mg BID \par -OTC Zinc 75-100mg per day \par -OTC Melatonin 1 or 2mg a night \par -OTC Vitamin D 1-4000mg per day \par -OTC Tonic Water 8oz per day\par -Water 0.5-1 gallon per day\par  \par \par Problem 1: moderate persistent asthma/ Co Asthma (active )\par - Continue Albuterol nebulizer at least BID up to QiD (gargle and spit after use)\par - change Trelegy 1 inhalation  to Breztri 2 puffs BID \par -continue to use Ventolin PRN and before exercise (receiving generic)\par -continue to use Singulair 10 mg before bed \par - Script given for prednisone to be used in case of *emergency*; pt was instructed to consult with the office *before* taking any prednisone \par -Asthma is believed to be caused by inherited (genetic) and environmental factor, but its exact cause is unknown. Asthma may be triggered by allergens, lung infections, or irritants in the air. Asthma triggers are different for each person\par -Inhaler technique reviewed as well as oral hygiene techniques reviewed with patient. Avoidance of cold air, extremes of temperature, rescue inhaler should be used before exercise. Order of medication reviewed with patient. Recommended use of a cool mist humidifier in the bedroom.\par \par problem 2: smoking cessation (4/29/2021)\par -acapella device for mucus clearance  \par -Discussed for five minutes with the patient the risks/associations with continued smoking including COPD, emphysema, shortness of breath, renal cancer, bladder cancer, stroke risk, cardiac disease, etc. Smoking cessation was discussed at length and highly encouraged. Various options to aid cessation was discussed including use of Chantix, Nicotrol, nicotine products, laser therapy, hypnosis, Wellbutrin, etc.\par \par problem 3: allergic rhinitis\par -continue to use nasal saline PRN\par -add Clarinex 5 mg QAM\par -continue Xyzal 5 mg before bed \par -try Ryvent 6 mg QDay\par -Environmental measures for allergies were encouraged including mattress and pillow cover, air purifier, and environmental controls. \par \par problem 4: poor breathing mechanics\par -Proper breathing techniques were reviewed with an emphasis of exhalation. Patient instructed to breath in for 1 second and out for four seconds. Patient was encouraged to not talk while walking.\par \par problem 5: overweight/deconditioning\par -recommended yoga and wellness visits\par -Weight loss, exercise, and diet control were discussed and are highly encouraged. Treatment options were given such as, aqua therapy, and contacting a nutritionist. Recommended to use the elliptical, stationary bike, less use of treadmill. Obesity is associated with worsening asthma, shortness of breath, and potential for cardiac disease, diabetes, and other underlying medical conditions. \par \par problem 7: constipation\par -recommended to use Dr. Lomax's Intestinal Formula #1 \par \par problem 8: health maintenance \par -s/p COVID-19 1/2021 - stable\par -recommend COVID-19 vaccine (J&J) \par -recommended yearly flu shot after October 15, refused \par -recommended strep pneumonia vaccines: Prevnar-13 vaccine, followed by Pneumo vaccine 23 one year following\par -recommended early intervention for URIs\par -recommended regular osteoporosis evaluations\par -recommended early dermatological evaluations\par -recommended after the age of 50 to the age of 70, colonoscopy every 5 years \par \par \par F/U in 4 months with Spi \par She is encouraged to call with any changes, concerns, or questions.

## 2021-06-28 ENCOUNTER — TRANSCRIPTION ENCOUNTER (OUTPATIENT)
Age: 51
End: 2021-06-28

## 2021-07-04 NOTE — ED ADULT NURSE NOTE - CAS ELECT INFOMATION PROVIDED
Addendum Note by Sravani Casillas 
DC instructions

## 2021-07-06 ENCOUNTER — RX RENEWAL (OUTPATIENT)
Age: 51
End: 2021-07-06

## 2021-08-19 ENCOUNTER — APPOINTMENT (OUTPATIENT)
Dept: PULMONOLOGY | Facility: CLINIC | Age: 51
End: 2021-08-19
Payer: COMMERCIAL

## 2021-08-19 VITALS
TEMPERATURE: 97.7 F | DIASTOLIC BLOOD PRESSURE: 60 MMHG | OXYGEN SATURATION: 98 % | HEART RATE: 88 BPM | BODY MASS INDEX: 26.5 KG/M2 | SYSTOLIC BLOOD PRESSURE: 140 MMHG | WEIGHT: 144 LBS | RESPIRATION RATE: 17 BRPM | HEIGHT: 62 IN

## 2021-08-19 DIAGNOSIS — L03.114 CELLULITIS OF LEFT UPPER LIMB: ICD-10-CM

## 2021-08-19 PROCEDURE — 99214 OFFICE O/P EST MOD 30 MIN: CPT | Mod: 25

## 2021-08-19 PROCEDURE — 99406 BEHAV CHNG SMOKING 3-10 MIN: CPT | Mod: 25

## 2021-08-19 RX ORDER — PREDNISONE 10 MG/1
10 TABLET ORAL
Qty: 50 | Refills: 0 | Status: DISCONTINUED | COMMUNITY
Start: 2021-04-29 | End: 2021-08-19

## 2021-08-19 NOTE — REASON FOR VISIT
[Follow-Up] : a follow-up visit [TextBox_44] : allergies, s/p COVID-19 1/2021, asthma, GERD, SOB,allergy

## 2021-08-19 NOTE — ASSESSMENT
[FreeTextEntry1] : Ms. Mars is a 51 year old female who has a history of coasthma, allergy, and GERD. She comes to the office s/p COVID-19. She continues to actively smoke (noncompliant). #1 issue untreated hypertension \par \par Her shortness of breath is multifactorial due to:\par -moderate persistent asthma\par -poor breathing mechanics\par -deconditioning \par -overweight\par \par Problem 1A: Covid 19 infection 1/2021\par - S/p Covid 19 vaccine (Pfizer) x2 5/2021\par -Not candidate for MAB \par -OTC Vitamin C 500mg BID \par -OTC Quercetin 250-500mg BID \par -OTC Zinc 75-100mg per day \par -OTC Melatonin 1 or 2mg a night \par -OTC Vitamin D 1-4000mg per day \par -OTC Tonic Water 8oz per day\par -Water 0.5-1 gallon per day\par \par Problem 1: moderate persistent asthma/ Co Asthma (quite)\par - Continue Albuterol nebulizer at least BID up to QiD (gargle and spit after use)\par - change Trelegy 1 inhalation  to Breztri 2 puffs BID \par -continue to use Ventolin PRN and before exercise (receiving generic)\par -continue to use Singulair 10 mg before bed \par - Script given for prednisone to be used in case of *emergency*; pt was instructed to consult with the office *before* taking any prednisone \par -Asthma is believed to be caused by inherited (genetic) and environmental factor, but its exact cause is unknown. Asthma may be triggered by allergens, lung infections, or irritants in the air. Asthma triggers are different for each person\par -Inhaler technique reviewed as well as oral hygiene techniques reviewed with patient. Avoidance of cold air, extremes of temperature, rescue inhaler should be used before exercise. Order of medication reviewed with patient. Recommended use of a cool mist humidifier in the bedroom.\par \par problem 2: smoking cessation (8/19/2021)\par -acapella device for mucus clearance  \par -Discussed for five minutes with the patient the risks/associations with continued smoking including COPD, emphysema, shortness of breath, renal cancer, bladder cancer, stroke risk, cardiac disease, etc. Smoking cessation was discussed at length and highly encouraged. Various options to aid cessation was discussed including use of Chantix, Nicotrol, nicotine products, laser therapy, hypnosis, Wellbutrin, etc.\par \par problem 3: allergic rhinitis\par -continue to use nasal saline PRN\par -continue Clarinex 5 mg QAM\par -continue Xyzal 5 mg before bed \par -try Ryvent 6 mg QDay if able \par -Environmental measures for allergies were encouraged including mattress and pillow cover, air purifier, and environmental controls. \par \par problem 4: poor breathing mechanics\par -Proper breathing techniques were reviewed with an emphasis of exhalation. Patient instructed to breath in for 1 second and out for four seconds. Patient was encouraged to not talk while walking.\par \par problem 5: overweight/deconditioning\par -recommended yoga and wellness visits\par -Weight loss, exercise, and diet control were discussed and are highly encouraged. Treatment options were given such as, aqua therapy, and contacting a nutritionist. Recommended to use the elliptical, stationary bike, less use of treadmill. Obesity is associated with worsening asthma, shortness of breath, and potential for cardiac disease, diabetes, and other underlying medical conditions. \par \par problem 7: constipation\par -recommended to use Dr. Lomax's Intestinal Formula #1 \par \par problem 8: health maintenance \par -s/p COVID-19 1/2021 - stable\par - S/p Covid 19 vaccine (Pfizer) x2 5/2021\par -recommended yearly flu shot after October 15, refused \par -recommended strep pneumonia vaccines: Prevnar-13 vaccine, followed by Pneumo vaccine 23 one year following\par -recommended early intervention for URIs\par -recommended regular osteoporosis evaluations\par -recommended early dermatological evaluations\par -recommended after the age of 50 to the age of 70, colonoscopy every 5 years \par \par F/U in 4 months with Spi \par She is encouraged to call with any changes, concerns, or questions.

## 2021-08-19 NOTE — HISTORY OF PRESENT ILLNESS
[FreeTextEntry1] : Ms. Mars is a 51 year old female presenting to the office for a follow up visit s/p asthmatic bronchitis, allergic rhinitis, asthma, nicotine addiction, and shortness of breath. 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 \par -s/p COVID-19 Jan/2021 \par -she notes eczema flair caused by her vaccine \par -she notes smoking pack/day \par -she notes her energy levels are at a 10/10 \par -she notes lack of sleep \par -she denies exercising \par -she notes cleaning the house and walking a bit\par -she notes her main issue is untreated hyperthyroid \par -she notes the rx she has now is working and good\par -she denies snoring \par - S/p Covid 19 vaccine (Pfizer) x2 \par patient denies any headaches, nausea, vomiting, fever, chills, sweats, chest pain, chest pressure, palpitations, coughing, wheezing, fatigue, diarrhea, constipation, dysphagia, myalgias, dizziness, leg swelling, leg pain, itchy eyes, itchy ears, heartburn, reflux or sour taste in the mouth

## 2021-08-19 NOTE — ADDENDUM
[FreeTextEntry1] : Documented by Champ Hollis acting as a scribe for Dr. Jose Miguel Francisco on (08/19/2021).\par \par All medical record entries made by the Scribe were at my, Dr. Jose Miguel Francisco's, direction and personally dictated by me on (08/19/2021). I have reviewed the chart and agree that the record accurately reflects my personal performance of the history, physical exam, assessment and plan. I have also personally directed, reviewed, and agree with the discharge instructions.\par

## 2021-12-10 DIAGNOSIS — Z01.812 ENCOUNTER FOR PREPROCEDURAL LABORATORY EXAMINATION: ICD-10-CM

## 2021-12-22 ENCOUNTER — TRANSCRIPTION ENCOUNTER (OUTPATIENT)
Age: 51
End: 2021-12-22

## 2021-12-23 ENCOUNTER — APPOINTMENT (OUTPATIENT)
Dept: PULMONOLOGY | Facility: CLINIC | Age: 51
End: 2021-12-23
Payer: COMMERCIAL

## 2021-12-23 ENCOUNTER — NON-APPOINTMENT (OUTPATIENT)
Age: 51
End: 2021-12-23

## 2021-12-23 VITALS
SYSTOLIC BLOOD PRESSURE: 120 MMHG | WEIGHT: 140 LBS | OXYGEN SATURATION: 98 % | HEART RATE: 96 BPM | HEIGHT: 62 IN | TEMPERATURE: 97.3 F | RESPIRATION RATE: 16 BRPM | DIASTOLIC BLOOD PRESSURE: 70 MMHG | BODY MASS INDEX: 25.76 KG/M2

## 2021-12-23 PROCEDURE — 99214 OFFICE O/P EST MOD 30 MIN: CPT | Mod: 25

## 2021-12-23 PROCEDURE — 94010 BREATHING CAPACITY TEST: CPT

## 2021-12-23 PROCEDURE — 99406 BEHAV CHNG SMOKING 3-10 MIN: CPT | Mod: 25

## 2021-12-23 PROCEDURE — 95012 NITRIC OXIDE EXP GAS DETER: CPT

## 2021-12-23 NOTE — ASSESSMENT
[FreeTextEntry1] : Ms. Mars is a 51 year old female who has a history of coasthma, allergy, and GERD. She comes to the office s/p COVID-19. She continues to actively smoke (noncompliant). #1 issue is hyperthyroid  \par \par Her shortness of breath is multifactorial due to:\par -moderate persistent asthma\par -poor breathing mechanics\par -deconditioning \par -overweight\par \par Problem 1A: Covid 19 infection 1/2021\par - S/p Covid 19 vaccine (Pfizer) x2 5/2021\par -Not candidate for MAB \par -OTC Vitamin C 500mg BID \par -OTC Quercetin 250-500mg BID \par -OTC Zinc 75-100mg per day \par -OTC Melatonin 1 or 2mg a night \par -OTC Vitamin D 1-4000mg per day \par -OTC Tonic Water 8oz per day\par -Water 0.5-1 gallon per day\par \par Problem 1: moderate persistent asthma/ Co Asthma (quiet)\par - Continue Albuterol nebulizer at least BID up to QiD (gargle and spit after use)\par -Add Breztri 2 puffs BID \par -continue to use Ventolin PRN and before exercise (receiving generic)\par -continue to use Singulair 10 mg before bed \par - Script given for prednisone to be used in case of *emergency*; pt was instructed to consult with the office *before* taking any prednisone \par -Asthma is believed to be caused by inherited (genetic) and environmental factor, but its exact cause is unknown. Asthma may be triggered by allergens, lung infections, or irritants in the air. Asthma triggers are different for each person\par -Inhaler technique reviewed as well as oral hygiene techniques reviewed with patient. Avoidance of cold air, extremes of temperature, rescue inhaler should be used before exercise. Order of medication reviewed with patient. Recommended use of a cool mist humidifier in the bedroom.\par \par problem 2: smoking cessation (12/23/2021)\par -acapella device for mucus clearance  \par -Discussed for five minutes with the patient the risks/associations with continued smoking including COPD, emphysema, shortness of breath, renal cancer, bladder cancer, stroke risk, cardiac disease, etc. Smoking cessation was discussed at length and highly encouraged. Various options to aid cessation was discussed including use of Chantix, Nicotrol, nicotine products, laser therapy, hypnosis, Wellbutrin, etc.\par \par problem 3: allergic rhinitis\par -continue to use nasal saline PRN\par -continue Clarinex 5 mg QAM\par -continue Xyzal 5 mg before bed \par -try Ryvent 6 mg QDay if able \par -Environmental measures for allergies were encouraged including mattress and pillow cover, air purifier, and environmental controls. \par \par problem 4: poor breathing mechanics\par -Proper breathing techniques were reviewed with an emphasis of exhalation. Patient instructed to breath in for 1 second and out for four seconds. Patient was encouraged to not talk while walking.\par \par problem 5: overweight/deconditioning (Improved)\par -recommended yoga and wellness visits\par -Weight loss, exercise, and diet control were discussed and are highly encouraged. Treatment options were given such as, aqua therapy, and contacting a nutritionist. Recommended to use the elliptical, stationary bike, less use of treadmill. Obesity is associated with worsening asthma, shortness of breath, and potential for cardiac disease, diabetes, and other underlying medical conditions. \par \par problem 7: constipation\par -recommended to use Dr. Lomax's Intestinal Formula #1 \par \par problem 8: health maintenance \par -s/p COVID-19 1/2021 - stable\par - S/p Covid 19 vaccine (Pfizer) x2 5/2021\par -recommended yearly flu shot after October 15, refused \par -recommended strep pneumonia vaccines: Prevnar-13 vaccine, followed by Pneumo vaccine 23 one year following\par -recommended early intervention for URIs\par -recommended regular osteoporosis evaluations\par -recommended early dermatological evaluations\par -recommended after the age of 50 to the age of 70, colonoscopy every 5 years \par \par F/U in 4 months with Spi \par She is encouraged to call with any changes, concerns, or questions.

## 2021-12-23 NOTE — ADDENDUM
[FreeTextEntry1] : Documented by Champ Hollis acting as a scribe for Dr. Jose Miguel Francisco on 12/23/2021 .\par \par All medical record entries made by the Scribe were at my, Dr. Jose Miguel Francisco's, direction and personally dictated by me on. I have reviewed the chart and agree that the record accurately reflects my personal performance of the history, physical exam, assessment and plan. I have also personally directed, reviewed, and agree with the discharge instructions.

## 2021-12-23 NOTE — HISTORY OF PRESENT ILLNESS
[FreeTextEntry1] : Ms. Mars is a 51 year old female presenting to the office for a follow up visit s/p asthmatic bronchitis, allergic rhinitis, asthma, nicotine addiction, and shortness of breath. Her chief complaint is \par -she notes still smoking less than pack a day\par -she notes sleeping alright \par -she notes sleeping 6 hrs/night on good day \par -she notes having a horse now \par -pt states normal bowel movements \par -she notes her energy is great\par -she notes her vision is okay \par -she notes breathing has been okay \par -she notes being low on the zpac \par -she notes having over-active thyroid\par -she notes losing some weight \par -she notes exercising with the horse \par -she notes eczema on her hands following covid-19 vaccine \par -she notes her biggest complaint  would be her thyroid (random shakes) \par denies any headaches, nausea, vomiting, fever, chills, sweats, chest pain, chest pressure, diarrhea, constipation, dysphagia, dizziness, leg swelling, leg pain, itchy eyes, itchy ears, heartburn, reflux, or sour taste in the mouth.

## 2021-12-30 ENCOUNTER — APPOINTMENT (OUTPATIENT)
Dept: PULMONOLOGY | Facility: CLINIC | Age: 51
End: 2021-12-30

## 2022-02-08 ENCOUNTER — OUTPATIENT (OUTPATIENT)
Dept: OUTPATIENT SERVICES | Facility: HOSPITAL | Age: 52
LOS: 1 days | End: 2022-02-08
Payer: COMMERCIAL

## 2022-02-08 ENCOUNTER — APPOINTMENT (OUTPATIENT)
Dept: ULTRASOUND IMAGING | Facility: IMAGING CENTER | Age: 52
End: 2022-02-08
Payer: COMMERCIAL

## 2022-02-08 DIAGNOSIS — Z98.891 HISTORY OF UTERINE SCAR FROM PREVIOUS SURGERY: Chronic | ICD-10-CM

## 2022-02-08 DIAGNOSIS — Z00.8 ENCOUNTER FOR OTHER GENERAL EXAMINATION: ICD-10-CM

## 2022-02-08 PROCEDURE — 76536 US EXAM OF HEAD AND NECK: CPT | Mod: 26

## 2022-02-08 PROCEDURE — 76536 US EXAM OF HEAD AND NECK: CPT

## 2022-02-11 ENCOUNTER — APPOINTMENT (OUTPATIENT)
Dept: ORTHOPEDIC SURGERY | Facility: CLINIC | Age: 52
End: 2022-02-11
Payer: COMMERCIAL

## 2022-02-11 VITALS
HEIGHT: 63 IN | DIASTOLIC BLOOD PRESSURE: 81 MMHG | WEIGHT: 140 LBS | SYSTOLIC BLOOD PRESSURE: 143 MMHG | HEART RATE: 100 BPM | BODY MASS INDEX: 24.8 KG/M2

## 2022-02-11 PROCEDURE — 99213 OFFICE O/P EST LOW 20 MIN: CPT

## 2022-02-11 PROCEDURE — 73030 X-RAY EXAM OF SHOULDER: CPT | Mod: LT

## 2022-03-04 ENCOUNTER — APPOINTMENT (OUTPATIENT)
Dept: ORTHOPEDIC SURGERY | Facility: CLINIC | Age: 52
End: 2022-03-04
Payer: COMMERCIAL

## 2022-03-04 VITALS
DIASTOLIC BLOOD PRESSURE: 69 MMHG | WEIGHT: 140 LBS | SYSTOLIC BLOOD PRESSURE: 146 MMHG | HEART RATE: 111 BPM | HEIGHT: 63 IN | BODY MASS INDEX: 24.8 KG/M2

## 2022-03-04 DIAGNOSIS — M25.512 PAIN IN LEFT SHOULDER: ICD-10-CM

## 2022-03-04 PROCEDURE — 99213 OFFICE O/P EST LOW 20 MIN: CPT

## 2022-03-08 ENCOUNTER — RX RENEWAL (OUTPATIENT)
Age: 52
End: 2022-03-08

## 2022-03-14 ENCOUNTER — OUTPATIENT (OUTPATIENT)
Dept: OUTPATIENT SERVICES | Facility: HOSPITAL | Age: 52
LOS: 1 days | End: 2022-03-14

## 2022-03-14 VITALS
RESPIRATION RATE: 16 BRPM | WEIGHT: 141.98 LBS | OXYGEN SATURATION: 99 % | HEART RATE: 89 BPM | SYSTOLIC BLOOD PRESSURE: 120 MMHG | TEMPERATURE: 97 F | DIASTOLIC BLOOD PRESSURE: 70 MMHG | HEIGHT: 61.5 IN

## 2022-03-14 DIAGNOSIS — Z98.51 TUBAL LIGATION STATUS: Chronic | ICD-10-CM

## 2022-03-14 DIAGNOSIS — O03.9 COMPLETE OR UNSPECIFIED SPONTANEOUS ABORTION WITHOUT COMPLICATION: Chronic | ICD-10-CM

## 2022-03-14 DIAGNOSIS — M12.9 ARTHROPATHY, UNSPECIFIED: ICD-10-CM

## 2022-03-14 DIAGNOSIS — Z98.890 OTHER SPECIFIED POSTPROCEDURAL STATES: Chronic | ICD-10-CM

## 2022-03-14 DIAGNOSIS — M75.42 IMPINGEMENT SYNDROME OF LEFT SHOULDER: ICD-10-CM

## 2022-03-14 DIAGNOSIS — Z98.891 HISTORY OF UTERINE SCAR FROM PREVIOUS SURGERY: Chronic | ICD-10-CM

## 2022-03-14 LAB
HCT VFR BLD CALC: 39.6 % — SIGNIFICANT CHANGE UP (ref 34.5–45)
HGB BLD-MCNC: 12.9 G/DL — SIGNIFICANT CHANGE UP (ref 11.5–15.5)
MCHC RBC-ENTMCNC: 26.3 PG — LOW (ref 27–34)
MCHC RBC-ENTMCNC: 32.6 GM/DL — SIGNIFICANT CHANGE UP (ref 32–36)
MCV RBC AUTO: 80.8 FL — SIGNIFICANT CHANGE UP (ref 80–100)
NRBC # BLD: 0 /100 WBCS — SIGNIFICANT CHANGE UP
NRBC # FLD: 0 K/UL — SIGNIFICANT CHANGE UP
PLATELET # BLD AUTO: 282 K/UL — SIGNIFICANT CHANGE UP (ref 150–400)
RBC # BLD: 4.9 M/UL — SIGNIFICANT CHANGE UP (ref 3.8–5.2)
RBC # FLD: 15.3 % — HIGH (ref 10.3–14.5)
T3 SERPL-MCNC: 189 NG/DL — SIGNIFICANT CHANGE UP (ref 80–200)
T4 FREE SERPL-MCNC: 1.7 NG/DL — SIGNIFICANT CHANGE UP (ref 0.9–1.8)
TSH SERPL-MCNC: <0.1 UIU/ML — LOW (ref 0.27–4.2)
WBC # BLD: 9.23 K/UL — SIGNIFICANT CHANGE UP (ref 3.8–10.5)
WBC # FLD AUTO: 9.23 K/UL — SIGNIFICANT CHANGE UP (ref 3.8–10.5)

## 2022-03-14 NOTE — H&P PST ADULT - PROBLEM SELECTOR PLAN 1
This is a 51 y/o female who is scheduled for left shoulder arthroscopic rotator cuff debridement decompression on 3-29-22  * To speak to surgeon regarding covid testing  * Given preop and cleanser instructions with good teach back and patient verbalized understanding  * Instructed to take normal am dose of methimazole, Singulair, and Albuterol (if needed) the am of surgery

## 2022-03-14 NOTE — H&P PST ADULT - MUSCULOSKELETAL COMMENTS
cervical herniated disc--c/o stiffness left shoulder arthropathy left shoulder arthropathy; cervical herniated disc--c/o stiffness

## 2022-03-14 NOTE — H&P PST ADULT - NSICDXPASTMEDICALHX_GEN_ALL_CORE_FT
PAST MEDICAL HISTORY:  Arthropathy left shoulder    Asthma     Cervical herniation     Gestational diabetes with 3 of 4 c-sections    H/O hyperthyroidism     Lumbar back pain

## 2022-03-14 NOTE — H&P PST ADULT - HISTORY OF PRESENT ILLNESS
This is a 53 y/o female who is presents with 2 year h/o progressively worsening symptomatology of left shoulder arthropathy. Has received multiple injections and PT which were unsuccessful. Scheduled for left shoulder arthroscopic rotator cuff debridement compression.  This is a 53 y/o female who is presents with 2 year h/o progressively worsening symptomatology of left shoulder arthropathy. Has received multiple injections and PT which were unsuccessful. MRI confirmed pathology. Scheduled for left shoulder arthroscopic rotator cuff debridement compression.

## 2022-03-14 NOTE — H&P PST ADULT - NSANTHOSAYNRD_GEN_A_CORE
No. PADDY screening performed.  STOP BANG Legend: 0-2 = LOW Risk; 3-4 = INTERMEDIATE Risk; 5-8 = HIGH Risk

## 2022-03-14 NOTE — H&P PST ADULT - NSICDXPASTSURGICALHX_GEN_ALL_CORE_FT
PAST SURGICAL HISTORY:  H/O tubal ligation bilateral 15 years ago    H/O:  x 4    Miscarriage x 1 with D&C    S/P bunionectomy bilateral

## 2022-03-22 ENCOUNTER — APPOINTMENT (OUTPATIENT)
Dept: ORTHOPEDIC SURGERY | Facility: AMBULATORY SURGERY CENTER | Age: 52
End: 2022-03-22

## 2022-03-28 ENCOUNTER — TRANSCRIPTION ENCOUNTER (OUTPATIENT)
Age: 52
End: 2022-03-28

## 2022-03-28 VITALS
OXYGEN SATURATION: 98 % | WEIGHT: 141.98 LBS | TEMPERATURE: 98 F | HEIGHT: 61.5 IN | HEART RATE: 79 BPM | SYSTOLIC BLOOD PRESSURE: 133 MMHG | DIASTOLIC BLOOD PRESSURE: 72 MMHG | RESPIRATION RATE: 18 BRPM

## 2022-03-28 NOTE — ASU PREOPERATIVE ASSESSMENT, ADULT ([facility] ONLY) - FALL HARM RISK - UNIVERSAL INTERVENTIONS
Bed in lowest position, wheels locked, appropriate side rails in place/Call bell, personal items and telephone in reach/Instruct patient to call for assistance before getting out of bed or chair/Non-slip footwear when patient is out of bed/[place] to call system/Physically safe environment - no spills, clutter or unnecessary equipment/Purposeful Proactive Rounding/Room/bathroom lighting operational, light cord in reach

## 2022-03-29 ENCOUNTER — TRANSCRIPTION ENCOUNTER (OUTPATIENT)
Age: 52
End: 2022-03-29

## 2022-03-29 ENCOUNTER — APPOINTMENT (OUTPATIENT)
Dept: ORTHOPEDIC SURGERY | Facility: AMBULATORY SURGERY CENTER | Age: 52
End: 2022-03-29

## 2022-03-29 ENCOUNTER — OUTPATIENT (OUTPATIENT)
Dept: OUTPATIENT SERVICES | Facility: HOSPITAL | Age: 52
LOS: 1 days | Discharge: HOME | End: 2022-03-29
Payer: COMMERCIAL

## 2022-03-29 VITALS
DIASTOLIC BLOOD PRESSURE: 65 MMHG | HEART RATE: 80 BPM | SYSTOLIC BLOOD PRESSURE: 114 MMHG | TEMPERATURE: 98 F | RESPIRATION RATE: 14 BRPM | OXYGEN SATURATION: 99 %

## 2022-03-29 DIAGNOSIS — M75.42 IMPINGEMENT SYNDROME OF LEFT SHOULDER: ICD-10-CM

## 2022-03-29 DIAGNOSIS — Z98.51 TUBAL LIGATION STATUS: Chronic | ICD-10-CM

## 2022-03-29 DIAGNOSIS — O03.9 COMPLETE OR UNSPECIFIED SPONTANEOUS ABORTION WITHOUT COMPLICATION: Chronic | ICD-10-CM

## 2022-03-29 DIAGNOSIS — Z98.890 OTHER SPECIFIED POSTPROCEDURAL STATES: Chronic | ICD-10-CM

## 2022-03-29 DIAGNOSIS — Z98.891 HISTORY OF UTERINE SCAR FROM PREVIOUS SURGERY: Chronic | ICD-10-CM

## 2022-03-29 PROCEDURE — 29823 SHO ARTHRS SRG XTNSV DBRDMT: CPT | Mod: LT

## 2022-03-29 RX ORDER — TRAMADOL HYDROCHLORIDE 50 MG/1
1 TABLET ORAL
Qty: 20 | Refills: 0
Start: 2022-03-29 | End: 2022-04-02

## 2022-03-29 NOTE — ASU DISCHARGE PLAN (ADULT/PEDIATRIC) - NS MD DC FALL RISK RISK
For information on Fall & Injury Prevention, visit: [email or web address] OR  [email or web address] OR  https://www.cdc.gov/steadi/patient.html

## 2022-03-29 NOTE — ASU PREOP CHECKLIST - DENTURES
Quality 137: Melanoma: Continuity Of Care - Recall System: Patient information entered into a recall system that includes: target date for the next exam specified AND a process to follow up with patients regarding missed or unscheduled appointments Quality 111:Pneumonia Vaccination Status For Older Adults: Pneumococcal Vaccination not Administered or Previously Received, Reason not Otherwise Specified Quality 110: Preventive Care And Screening: Influenza Immunization: Influenza immunization was not ordered or administered, reason not given Detail Level: Detailed no

## 2022-03-29 NOTE — PACU DISCHARGE NOTE - PAIN:
Preventive Health Recommendations:     See your health care provider every year to    Review health changes.     Discuss preventive care.      Review your medicines if your doctor has prescribed any.    Talk with your health care provider about whether you should have a test to screen for prostate cancer (PSA).    Every 3 years, have a diabetes test (fasting glucose). If you are at risk for diabetes, you should have this test more often.    Every 5 years, have a cholesterol test. Have this test more often if you are at risk for high cholesterol or heart disease.     Every 10 years, have a colonoscopy. Or, have a yearly FIT test (stool test). These exams will check for colon cancer.    Talk to with your health care provider about screening for Abdominal Aortic Aneurysm if you have a family history of AAA or have a history of smoking.  Shots:     Get a flu shot each year.     Get a tetanus shot every 10 years.     Talk to your doctor about your pneumonia vaccines. There are now two you should receive - Pneumovax (PPSV 23) and Prevnar (PCV 13).    Talk to your pharmacist about a shingles vaccine.     Talk to your doctor about the hepatitis B vaccine.  Nutrition:     Eat at least 5 servings of fruits and vegetables each day.     Eat whole-grain bread, whole-wheat pasta and brown rice instead of white grains and rice.     Get adequate Calcium and Vitamin D.   Lifestyle    Exercise for at least 150 minutes a week (30 minutes a day, 5 days a week). This will help you control your weight and prevent disease.     Limit alcohol to one drink per day.     No smoking.     Wear sunscreen to prevent skin cancer.     See your dentist every six months for an exam and cleaning.     See your eye doctor every 1 to 2 years to screen for conditions such as glaucoma, macular degeneration and cataracts.    Personalized Prevention Plan  You are due for the preventive services outlined below.  Your care team is available to assist you in  scheduling these services.  If you have already completed any of these items, please share that information with your care team to update in your medical record.    Health Maintenance Due   Topic Date Due     HIV SCREEN (SYSTEM ASSIGNED)  06/28/1971     Hepatitis C Screening  06/28/1971     Zoster (Shingles) Vaccine (1 of 2) 06/28/2003     Discuss Advance Directive Planning  06/28/2008     Diptheria Tetanus Pertussis (DTAP/TDAP/TD) Vaccine (1 - Tdap) 12/03/2013     FALL RISK ASSESSMENT  06/28/2018     Pneumococcal Vaccine (1 of 2 - PCV13) 06/28/2018     AORTIC ANEURYSM SCREENING (SYSTEM ASSIGNED)  06/28/2018     Depression Assessment 2 - yearly  03/16/2019     Annual Wellness Visit  03/16/2019         Patient Education     Eating Heart-Healthy Food: Using the DASH Plan    Eating for your heart doesn t have to be hard or boring. You just need to know how to make healthier choices. The DASH eating plan has been developed to help you do just that. DASH stands for Dietary Approaches to Stop Hypertension. It is a plan that has been proven to be healthier for your heart and to lower your risk for high blood pressure. It can also help lower your risk for cancer, heart disease, osteoporosis, and diabetes.  Choosing from each food group  Choose foods from each of the food groups below each day. Try to get the recommended number of servings for each food group. The serving numbers are based on a diet of 2,000 calories a day. Talk to your doctor if you re unsure about your calorie needs. Along with getting the correct servings, the DASH plan also recommends a sodium intake less than 2,300 mg per day.        Grains  Servings: 6 to 8 a day  A serving is:    1 slice bread    1 ounce dry cereal    Half a cup cooked rice, pasta or cereal  Best choices: Whole grains and any grains high in fiber. Vegetables  Servings: 4 to 5 a day  A serving is:    1 cup raw leafy vegetable    Half a cup cut-up raw or cooked vegetable    Half a cup  vegetable juice  Best choices: Fresh or frozen vegetables prepared without added salt or fat.   Fruits  Servings: 4 to 5 a day  A serving is:    1 medium fruit    One-quarter cup dried fruit    Half a cup fresh, frozen, or canned fruit    Half a cup of 100% fruit juices  Best choices: A variety of fresh fruits of different colors. Whole fruits are a better choice than fruit juices. Low-fat or fat-free dairy  Servings: 2 to 3 a day  A serving is:    1 cup milk    1 cup yogurt    One and a half ounces cheese  Best choices: Skim or 1% milk, low-fat or fat-free yogurt or buttermilk, and low-fat cheeses.         Lean meats, poultry, fish  Servings: 6 or fewer a day  A serving is:    1 ounce cooked meats, poultry, or fish    1 egg  Best choices: Lean poultry and fish. Trim away visible fat. Broil, grill, roast, or boil instead of frying. Remove skin from poultry before eating. Limit how much red meat you eat.  Nuts, seeds, beans  Servings: 4 to 5 a week  A serving is:    One-third cup nuts (one and a half ounces)    2 tablespoons nut butter or seeds    Half a cup cooked dry beans or legumes  Best choices: Dry roasted nuts with no salt added, lentils, kidney beans, garbanzo beans, and whole rayo beans.   Fats and oils  Servings: 2 to 3 a day  A serving is:    1 teaspoon vegetable oil    1 teaspoon soft margarine    1 tablespoon mayonnaise    2 tablespoons salad dressing  Best choices: Nut and vegetable oils (nontropical vegetable oils), such as olive and canola oil. Sweets  Servings: 5 a week or fewer  A serving is:    1 tablespoon sugar, maple syrup, or honey    1 tablespoon jam or jelly    1 half-ounce jelly beans (about 15)    1 cup lemonade  Best choices: Dried fruit can be a satisfying sweet. Choose low-fat sweets. And watch your serving sizes!      For more on the DASH eating plan, visit:  www.nhlbi.nih.gov/health/health-topics/topics/dash   Date Last Reviewed: 6/1/2016 2000-2018 [employer] [address]  [place] [address]. All rights reserved. This information is not intended as a substitute for professional medical care. Always follow your healthcare professional's instructions.           Patient Education     Preventing Osteoporosis: Meeting Your Calcium Needs    Your body needs calcium to build and repair bones. But it can't make calcium on its own. That's why it's important to eat calcium-rich foods. Some foods are naturally rich in calcium. Others have calcium added (fortified). It's best to get calcium from the foods you eat. But if you can't get enough, you may want to take calcium supplements. To meet your daily calcium needs, try the foods listed below.  Dairy Fish & beans Other sources   Source   Calcium (mg) per serving   Source   Calcium (mg) per serving   Source   Calcium (mg) per serving   Low-fat yogurt, plain   415 mg/8 oz.   Sardines, Atlantic, canned, with bones   351 mg/3 oz.   Oatmeal, instant, fortified   215 mg/1 cup   Nonfat milk   302 mg/1 cup   [place], sockeye, canned, with bones   239 mg/3 oz.   Tofu made with calcium sulfate   204 mg/3 oz.   Low-fat milk   297 mg/1 cup   Soybeans, fresh, boiled   131 mg/1/2 cup   Collards   179 mg/1/2 cup   Swiss cheese   272 mg/1 oz.   White beans, cooked   81 mg/1/2 cup   English muffin, whole wheat   175 mg/1 muffin   Cheddar cheese   205 mg/1 oz.   Navy beans, cooked   79 mg/1/2 cup   Kale   90 mg/1/2 cup   Ice cream strawberry   79 mg/1/2 cup           Orange, navel   56 mg/1 medium   Note: Calcium levels may vary depending on brand and size.  Daily calcium needs  14 to 18 years old: 1,300 mg  19 to 30 years old: 1,000 mg  31 to 50 years old: 1,000 mg  51 to 70 years old, women: 1,200 mg  51 to 70 years old, men: 1,000 mg  Pregnant or nursin to 18 years old: 1,300 mg, 19 to 50 years old: 1,000 mg  Older than 70 (women and men): 1,200 mg   Date Last Reviewed: 2018-2018 [employer]. [address] [place] [address]  Road, [place] [address]. All rights reserved. This information is not intended as a substitute for professional medical care. Always follow your healthcare professional's instructions.         Please call our [facility] Scheduling Line at [phone] to schedule your:  Ultrasound              Controlled with current regime

## 2022-03-29 NOTE — ASU DISCHARGE PLAN (ADULT/PEDIATRIC) - CARE PROVIDER_API CALL
Zaheer Howard)  Orthopaedic Sports Medicine; Orthopaedic Surgery  [address] [place] [address]  Phone: [phone]  Fax: [phone]  Follow Up Time:

## 2022-03-29 NOTE — BRIEF OPERATIVE NOTE - NSICDXBRIEFPROCEDURE_GEN_ALL_CORE_FT
PROCEDURES:  Arthroscopy of shoulder with partial acromioplasty and subacromial decompression 29-Mar-2022 09:55:53  Geo Tello

## 2022-04-04 ENCOUNTER — APPOINTMENT (OUTPATIENT)
Dept: ORTHOPEDIC SURGERY | Facility: CLINIC | Age: 52
End: 2022-04-04
Payer: COMMERCIAL

## 2022-04-04 DIAGNOSIS — Z00.00 ENCOUNTER FOR GENERAL ADULT MEDICAL EXAMINATION W/OUT ABNORMAL FINDINGS: ICD-10-CM

## 2022-04-04 PROBLEM — O24.419 GESTATIONAL DIABETES MELLITUS IN PREGNANCY, UNSPECIFIED CONTROL: Chronic | Status: ACTIVE | Noted: 2022-03-14

## 2022-04-04 PROBLEM — Z86.39 PERSONAL HISTORY OF OTHER ENDOCRINE, NUTRITIONAL AND METABOLIC DISEASE: Chronic | Status: ACTIVE | Noted: 2022-03-14

## 2022-04-04 PROBLEM — M12.9 ARTHROPATHY, UNSPECIFIED: Chronic | Status: ACTIVE | Noted: 2022-03-14

## 2022-04-04 PROCEDURE — 99024 POSTOP FOLLOW-UP VISIT: CPT

## 2022-04-04 RX ORDER — TRAMADOL HYDROCHLORIDE 50 MG/1
50 TABLET, COATED ORAL
Qty: 20 | Refills: 0 | Status: ACTIVE | COMMUNITY
Start: 2022-04-04 | End: 1900-01-01

## 2022-04-15 NOTE — ED PROVIDER NOTE - NEUROLOGICAL DEEP TENDON REFLEXES
HPI  Pt is a 73yo M admitted to [facility] for COPD, aspiration pna s/p intubation.   Pt was seen in the chair with family members at bedside. Note to have persistant hypotension. abd distension noted w no BM x 2 days. Also noted to have PEG tube leakage.     Vital Signs Last 24 Hrs  T(C): 36.8 (15 Apr 2022 08:00), Max: 37.1 (14 Apr 2022 12:00)  T(F): 98.3 (15 Apr 2022 08:00), Max: 98.8 (14 Apr 2022 12:00)  HR: 80 (15 Apr 2022 08:55) (80 - 103)  BP: 118/37 (15 Apr 2022 08:01) (118/37 - 151/48)  BP(mean): 57 (15 Apr 2022 08:01) (57 - 84)  RR: 16 (15 Apr 2022 08:01) (16 - 24)  SpO2: 97% (15 Apr 2022 08:55) (96% - 100%)    I&O's Summary    14 Apr 2022 07:01  -  15 Apr 2022 07:00  --------------------------------------------------------  IN: 1180 mL / OUT: 1130 mL / NET: 50 mL    15 Apr 2022 07:01  -  15 Apr 2022 10:23  --------------------------------------------------------  IN: 0 mL / OUT: 100 mL / NET: -100 mL        CAPILLARY BLOOD GLUCOSE      POCT Blood Glucose.: 149 mg/dL (15 Apr 2022 06:27)  POCT Blood Glucose.: 188 mg/dL (15 Apr 2022 02:40)  POCT Blood Glucose.: 220 mg/dL (14 Apr 2022 18:27)  POCT Blood Glucose.: 211 mg/dL (14 Apr 2022 12:19)      PHYSICAL EXAM:  General: Elderly male sitting in bed comfortably. No acute distress but looks tired.   HEENT: EOMI. Clear conjunctivae. Moist mucus membrane.   Neck: Supple.   Chest: Decreased air entry at bases.    Heart: Normal S1 & S2. RRR.   Abdomen: Soft. Non-tender. Non-distended. + BS. G-tube in place with small amount leakage noted at 3 o'clock   Ext: No pedal edema. No calf tenderness   Neuro: Awake and alert. No focal deficit. Speech soft.   Skin: Warm and Dry  Psychiatry: Normal mood and affect    MEDICATIONS:  MEDICATIONS  (STANDING):  albuterol/ipratropium for Nebulization 3 milliLiter(s) Nebulizer every 6 hours  aspirin  chewable 81 milliGRAM(s) Oral daily  atorvastatin 40 milliGRAM(s) Oral at bedtime  chlorhexidine 2% Cloths 1 Application(s) Topical daily  chlorhexidine 2% Cloths 1 Application(s) Topical <User Schedule>  cholecalciferol 1000 Unit(s) Oral daily  dextrose 5%. 1000 milliLiter(s) (50 mL/Hr) IV Continuous <Continuous>  dextrose 5%. 1000 milliLiter(s) (100 mL/Hr) IV Continuous <Continuous>  dextrose 50% Injectable 25 Gram(s) IV Push once  dextrose 50% Injectable 12.5 Gram(s) IV Push once  dextrose 50% Injectable 25 Gram(s) IV Push once  ferrous    sulfate Liquid 300 milliGRAM(s) Enteral Tube daily  glucagon  Injectable 1 milliGRAM(s) IntraMuscular once  heparin   Injectable 5000 Unit(s) SubCutaneous every 12 hours  insulin lispro (ADMELOG) corrective regimen sliding scale   SubCutaneous every 6 hours  lactobacillus acidophilus 1 Tablet(s) Oral two times a day  levothyroxine 112 MICROGram(s) Oral daily  methylPREDNISolone sodium succinate Injectable 40 milliGRAM(s) IV Push every 8 hours  oxybutynin 5 milliGRAM(s) Oral two times a day  pantoprazole  Injectable 40 milliGRAM(s) IV Push daily  sodium chloride 0.9%. 1000 milliLiter(s) (100 mL/Hr) IV Continuous <Continuous>      LABS: All Labs Reviewed:                        7.8    16.56 )-----------( 259      ( 15 Apr 2022 07:57 )             26.0     04-15    140  |  97<L>  |  48.8<H>  ----------------------------<  155<H>  4.6   |  37.0<H>  |  0.83    Ca    8.9      15 Apr 2022 07:57  Phos  2.7     04-15  Mg     2.1     04-15            Blood Culture: 04-12 @ 09:19  Organism --  Gram Stain Blood -- Gram Stain --  Specimen Source .Blood Blood  Culture-Blood --        RADIOLOGY/EKG:    DVT PPX:    ADVANCED DIRECTIVE:    DISPOSITION: normal

## 2022-04-26 ENCOUNTER — NON-APPOINTMENT (OUTPATIENT)
Age: 52
End: 2022-04-26

## 2022-04-26 ENCOUNTER — APPOINTMENT (OUTPATIENT)
Dept: PULMONOLOGY | Facility: CLINIC | Age: 52
End: 2022-04-26
Payer: COMMERCIAL

## 2022-04-26 VITALS
DIASTOLIC BLOOD PRESSURE: 76 MMHG | BODY MASS INDEX: 28.32 KG/M2 | RESPIRATION RATE: 16 BRPM | HEIGHT: 61 IN | SYSTOLIC BLOOD PRESSURE: 120 MMHG | WEIGHT: 150 LBS | HEART RATE: 68 BPM

## 2022-04-26 PROCEDURE — 99214 OFFICE O/P EST MOD 30 MIN: CPT | Mod: 25

## 2022-04-26 PROCEDURE — 95012 NITRIC OXIDE EXP GAS DETER: CPT

## 2022-04-26 PROCEDURE — 94010 BREATHING CAPACITY TEST: CPT

## 2022-04-26 PROCEDURE — 99406 BEHAV CHNG SMOKING 3-10 MIN: CPT | Mod: 25

## 2022-04-26 RX ORDER — PREDNISONE 10 MG/1
10 TABLET ORAL
Qty: 45 | Refills: 0 | Status: DISCONTINUED | COMMUNITY
Start: 2021-12-23 | End: 2022-04-26

## 2022-04-26 RX ORDER — AZITHROMYCIN 500 MG/1
500 TABLET, FILM COATED ORAL DAILY
Qty: 5 | Refills: 0 | Status: DISCONTINUED | COMMUNITY
Start: 2021-12-23 | End: 2022-04-26

## 2022-04-26 RX ORDER — DESLORATADINE 5 MG/1
5 TABLET ORAL DAILY
Qty: 90 | Refills: 1 | Status: ACTIVE | COMMUNITY
Start: 2019-11-05 | End: 1900-01-01

## 2022-04-26 NOTE — PROCEDURE
[FreeTextEntry1] : FENO was less than 5; a normal value being less than 25. Fractional exhaled nitric oxide (FENO) is regarded as a simple, noninvasive method for assessing eosinophilic airway inflammation. Produced by a variety of cells within the lung, nitric oxide (NO) concentrations are generally low in healthy individuals. However, high concentrations of NO appear to be involved in nonspecific host defense mechanisms and chronic inflammatory  diseases such as asthma. The American Thoracic Society (ATS) therefore recommended using FENO to aid in the diagnosis and monitoring of eosinophilic airway inflammation and asthma, and for identifying steroid responsive individuals whose chronic respiratory symptoms may be caused by airway inflammation \par \par PFT revealed normal flows, with a FEV1 of 2.15L, which is 107% of predicted, with a normal flow volume loop

## 2022-04-26 NOTE — HISTORY OF PRESENT ILLNESS
[FreeTextEntry1] : Ms. Mars is a 52 year old female presenting to the office for a follow up visit s/p asthmatic bronchitis, allergic rhinitis, asthma, nicotine addiction, and shortness of breath. Her chief complaint is \par -s/p COVID-19 x2 (1/2021, 12/2021)\par -s/p recent shoulder surgery\par -she notes losing at least 10 lbs but maybe 15\par -she notes the weight loss was not intentional and it was due to hypothyroid\par -she notes her first case of COVID was worse than the second one\par -she notes she did not have a fever and allergy Rx worked well for her second case of COVID\par -she notes one of her doctors thinks her hypothyroidism came from the COVID vaccine\par -she notes her skin has been peeling off her feet and has been using ointment but it has not been helping\par -she notes she was put on a steroid cream\par -she notes her breathing is good\par -she notes she is still smoking\par -she denies coughing or wheezing\par -she notes she was given triamcinolone which did not work and the other cream is fluocinolone which has been helping\par \par -patient denies any headaches, nausea, vomiting, fever, chills, sweats, chest pain, chest pressure, palpitations, coughing, wheezing, fatigue, diarrhea, constipation, dysphagia, myalgias, dizziness, leg swelling, leg pain, itchy eyes, itchy ears, heartburn, reflux or sour taste in the mouth

## 2022-04-26 NOTE — ASSESSMENT
[FreeTextEntry1] : Ms. Mars is a 52 year old female who has a history of coasthma, allergy, and GERD. She comes to the office s/p COVID-19. She continues to actively smoke (noncompliant). #1 issue is hyperthyroid / #2 exfoliating dermatitis \par \par Her shortness of breath is multifactorial due to:\par -moderate persistent asthma\par -poor breathing mechanics\par -deconditioning \par -overweight\par \par Problem 1A: Covid 19 infection 1/2021, 12/2021\par - S/p Covid 19 vaccine (Pfizer) x2 5/2021\par -Not candidate for MAB \par -OTC Vitamin C 500mg BID \par -OTC Quercetin 250-500mg BID \par -OTC Zinc 75-100mg per day \par -OTC Melatonin 1 or 2mg a night \par -OTC Vitamin D 1-4000mg per day \par -OTC Tonic Water 8oz per day\par -Water 0.5-1 gallon per day\par \par Problem 1: moderate persistent asthma/ Co Asthma (quiet)\par - Continue Albuterol nebulizer at least BID up to QiD (gargle and spit after use)\par -Add Breztri 2 puffs BID \par -continue to use Ventolin PRN and before exercise (receiving generic)\par -continue to use Singulair 10 mg before bed \par - Script given for prednisone to be used in case of *emergency*; pt was instructed to consult with the office *before* taking any prednisone \par -Asthma is believed to be caused by inherited (genetic) and environmental factor, but its exact cause is unknown. Asthma may be triggered by allergens, lung infections, or irritants in the air. Asthma triggers are different for each person\par -Inhaler technique reviewed as well as oral hygiene techniques reviewed with patient. Avoidance of cold air, extremes of temperature, rescue inhaler should be used before exercise. Order of medication reviewed with patient. Recommended use of a cool mist humidifier in the bedroom.\par \par problem 2: smoking cessation (4/26/2022)\par -acapella device for mucus clearance  \par -Discussed for five minutes with the patient the risks/associations with continued smoking including COPD, emphysema, shortness of breath, renal cancer, bladder cancer, stroke risk, cardiac disease, etc. Smoking cessation was discussed at length and highly encouraged. Various options to aid cessation was discussed including use of Chantix, Nicotrol, nicotine products, laser therapy, hypnosis, Wellbutrin, etc.\par \par problem 3: allergic rhinitis\par -continue to use nasal saline PRN\par -continue Clarinex 5 mg QAM\par -continue Xyzal 5 mg before bed  \par -Environmental measures for allergies were encouraged including mattress and pillow cover, air purifier, and environmental controls. \par \par problem 4: poor breathing mechanics\par -Proper breathing techniques were reviewed with an emphasis of exhalation. Patient instructed to breath in for 1 second and out for four seconds. Patient was encouraged to not talk while walking.\par \par problem 5: overweight/deconditioning (Improved)\par -recommended yoga and wellness visits\par -Weight loss, exercise, and diet control were discussed and are highly encouraged. Treatment options were given such as, aqua therapy, and contacting a nutritionist. Recommended to use the elliptical, stationary bike, less use of treadmill. Obesity is associated with worsening asthma, shortness of breath, and potential for cardiac disease, diabetes, and other underlying medical conditions. \par \par problem 7: constipation\par -recommended to use Dr. Lomax's Intestinal Formula #1 \par \par problem 8: health maintenance \par -s/p COVID-19 1/2021 - stable\par - S/p Covid 19 vaccine (Pfizer) x2 5/2021\par -recommended yearly flu shot after October 15, refused \par -recommended strep pneumonia vaccines: Prevnar-13 vaccine, followed by Pneumo vaccine 23 one year following\par -recommended early intervention for URIs\par -recommended regular osteoporosis evaluations\par -recommended early dermatological evaluations\par -recommended after the age of 50 to the age of 70, colonoscopy every 5 years \par \par F/U in 4 months with Spi \par She is encouraged to call with any changes, concerns, or questions.

## 2022-04-26 NOTE — ADDENDUM
[FreeTextEntry1] : Documented by Marques Howard acting as a scribe for Dr. Jose Miguel Francisco on 04/26/2022\par \par All medical record entries made by the scribe were at my, Dr. Jose Miguel Francisco's, direction and personally dictated by me on 04/26/2022. I have reviewed the chart and agree that the record accurately reflects my personal performance of the history, physical exam, assessment, and plan. I have also personally directed, reviewed, and agree with the discharge instructions.

## 2022-05-27 ENCOUNTER — APPOINTMENT (OUTPATIENT)
Dept: ORTHOPEDIC SURGERY | Facility: CLINIC | Age: 52
End: 2022-05-27
Payer: COMMERCIAL

## 2022-05-27 VITALS — BODY MASS INDEX: 28.32 KG/M2 | WEIGHT: 150 LBS | HEIGHT: 61 IN

## 2022-05-27 DIAGNOSIS — M67.912 UNSPECIFIED DISORDER OF SYNOVIUM AND TENDON, LEFT SHOULDER: ICD-10-CM

## 2022-05-27 DIAGNOSIS — M75.42 IMPINGEMENT SYNDROME OF LEFT SHOULDER: ICD-10-CM

## 2022-05-27 PROCEDURE — 99024 POSTOP FOLLOW-UP VISIT: CPT

## 2022-06-29 ENCOUNTER — RESULT REVIEW (OUTPATIENT)
Age: 52
End: 2022-06-29

## 2022-07-30 ENCOUNTER — RX RENEWAL (OUTPATIENT)
Age: 52
End: 2022-07-30

## 2022-08-29 ENCOUNTER — APPOINTMENT (OUTPATIENT)
Dept: PULMONOLOGY | Facility: CLINIC | Age: 52
End: 2022-08-29

## 2022-08-31 ENCOUNTER — APPOINTMENT (OUTPATIENT)
Dept: PULMONOLOGY | Facility: CLINIC | Age: 52
End: 2022-08-31

## 2022-08-31 PROCEDURE — 95012 NITRIC OXIDE EXP GAS DETER: CPT

## 2022-08-31 PROCEDURE — 94727 GAS DIL/WSHOT DETER LNG VOL: CPT

## 2022-08-31 PROCEDURE — 94729 DIFFUSING CAPACITY: CPT

## 2022-08-31 PROCEDURE — 94010 BREATHING CAPACITY TEST: CPT

## 2022-10-20 ENCOUNTER — RX RENEWAL (OUTPATIENT)
Age: 52
End: 2022-10-20

## 2022-11-25 ENCOUNTER — NON-APPOINTMENT (OUTPATIENT)
Age: 52
End: 2022-11-25

## 2022-11-25 ENCOUNTER — APPOINTMENT (OUTPATIENT)
Dept: PULMONOLOGY | Facility: CLINIC | Age: 52
End: 2022-11-25
Payer: COMMERCIAL

## 2022-11-25 VITALS
TEMPERATURE: 97.5 F | RESPIRATION RATE: 16 BRPM | HEIGHT: 61 IN | DIASTOLIC BLOOD PRESSURE: 78 MMHG | SYSTOLIC BLOOD PRESSURE: 118 MMHG | HEART RATE: 72 BPM | OXYGEN SATURATION: 98 % | BODY MASS INDEX: 27 KG/M2 | WEIGHT: 143 LBS

## 2022-11-25 PROCEDURE — 95012 NITRIC OXIDE EXP GAS DETER: CPT

## 2022-11-25 PROCEDURE — 99214 OFFICE O/P EST MOD 30 MIN: CPT | Mod: 25

## 2022-11-25 PROCEDURE — 99406 BEHAV CHNG SMOKING 3-10 MIN: CPT | Mod: 25

## 2022-11-25 PROCEDURE — 94010 BREATHING CAPACITY TEST: CPT

## 2022-11-25 RX ORDER — AMOXICILLIN 500 MG/1
500 CAPSULE ORAL
Qty: 21 | Refills: 0 | Status: DISCONTINUED | COMMUNITY
Start: 2022-09-02

## 2022-11-25 RX ORDER — BECLOMETHASONE DIPROPIONATE 80 UG/1
80 AEROSOL, METERED NASAL
Qty: 1 | Refills: 3 | Status: ACTIVE | COMMUNITY
Start: 2022-11-25 | End: 1900-01-01

## 2022-11-25 NOTE — REASON FOR VISIT
[Follow-Up] : a follow-up visit [TextBox_44] : allergies, s/p COVID-19 1/2021 and 12/2021, asthma, GERD, SOB,allergy

## 2022-11-25 NOTE — ASSESSMENT
[FreeTextEntry1] : Ms. Mars is a 52 year old female who has a history of asthma, allergy, hyperthyroid, exfoliating dermatitis \par  and GERD. She comes to the office s/p COVID-19. She continues to actively smoke (noncompliant). #1 issue is URI (sinus Sx) \par \par Her shortness of breath is multifactorial due to:\par -moderate persistent asthma\par -poor breathing mechanics\par -deconditioning \par -overweight\par \par Problem 1A: Covid 19 infection 1/2021, 12/2021\par - S/p Covid 19 vaccine (Pfizer) x2 5/2021\par -Not candidate for MAB \par -OTC Vitamin C 500mg BID \par -OTC Quercetin 250-500mg BID \par -OTC Zinc 75-100mg per day \par -OTC Melatonin 1 or 2mg a night \par -OTC Vitamin D 1-4000mg per day \par -OTC Tonic Water 8oz per day\par -Water 0.5-1 gallon per day\par \par Problem 1: moderate persistent asthma/ Co Asthma (quiet)\par - Continue Albuterol nebulizer at least BID up to QiD (gargle and spit after use)\par -Continue Breztri 2 puffs BID \par -continue to use Ventolin PRN and before exercise (receiving generic)\par -continue to use Singulair 10 mg before bed \par - Script given for prednisone to be used in case of *emergency*; pt was instructed to consult with the office *before* taking any prednisone \par -Asthma is believed to be caused by inherited (genetic) and environmental factor, but its exact cause is unknown. Asthma may be triggered by allergens, lung infections, or irritants in the air. Asthma triggers are different for each person\par -Inhaler technique reviewed as well as oral hygiene techniques reviewed with patient. Avoidance of cold air, extremes of temperature, rescue inhaler should be used before exercise. Order of medication reviewed with patient. Recommended use of a cool mist humidifier in the bedroom.\par \par problem 2: smoking cessation (smoking cessation discussed with patient 11/25/2022)\par -acapella device for mucus clearance  \par -Discussed for five minutes with the patient the risks/associations with continued smoking including COPD, emphysema, shortness of breath, renal cancer, bladder cancer, stroke risk, cardiac disease, etc. Smoking cessation was discussed at length and highly encouraged. Various options to aid cessation was discussed including use of Chantix, Nicotrol, nicotine products, laser therapy, hypnosis, Wellbutrin, etc.\par \par Problem 3A: URI \par -add Prednisone 20 mg x 7 days, 10 mg x 7 days if symptoms persist\par -Information sheet given to the patient to be reviewed, this medication is never to be used without consulting the prescribing physician. Proper dietary restraint is necessary specifically salt containing foods, if any reaction may occur should be reported. \par \par -add Kerry [place] Cold & Sinus \par -recommended saline \par -add Augmentin 875 mg BID if symptoms persist\par Recommend supportive care including antipyretics, fluids, OTC cough/cold medications if age-appropriate, and nasal saline followed by nasal suction. Return if symptoms worsen or persist. \par \par problem 3: allergic rhinitis\par -continue to use nasal saline PRN\par -continue Clarinex 5 mg QAM\par -continue Xyzal 5 mg before bed  \par -Environmental measures for allergies were encouraged including mattress and pillow cover, air purifier, and environmental controls. \par \par problem 4: poor breathing mechanics\par -Proper breathing techniques were reviewed with an emphasis of exhalation. Patient instructed to breath in for 1 second and out for four seconds. Patient was encouraged to not talk while walking.\par \par problem 5: overweight/deconditioning (Improved)\par -recommended yoga and wellness visits\par -Weight loss, exercise, and diet control were discussed and are highly encouraged. Treatment options were given such as, aqua therapy, and contacting a nutritionist. Recommended to use the elliptical, stationary bike, less use of treadmill. Obesity is associated with worsening asthma, shortness of breath, and potential for cardiac disease, diabetes, and other underlying medical conditions. \par \par problem 7: constipation\par -recommended to use Dr. Lomax's Intestinal Formula #1 \par \par problem 8: health maintenance \par -s/p COVID-19 1/2021 - stable\par - S/p Covid 19 vaccine (Pfizer) x2 5/2021\par -recommended yearly flu shot after October 15, refused \par -recommended strep pneumonia vaccines: Prevnar-13 vaccine, followed by Pneumo vaccine 23 one year following\par -recommended early intervention for URIs\par -recommended regular osteoporosis evaluations\par -recommended early dermatological evaluations\par -recommended after the age of 50 to the age of 70, colonoscopy every 5 years \par \par F/U in 4 months with Spi \par She is encouraged to call with any changes, concerns, or questions.

## 2022-11-25 NOTE — HISTORY OF PRESENT ILLNESS
[FreeTextEntry1] : Ms. Mars is a 52 year old female presenting to the office for a follow up visit s/p asthmatic bronchitis, allergic rhinitis, asthma, nicotine addiction, and shortness of breath. Her chief complaint is \par \par -she notes currently in the midst of URI with mild sinus symptoms \par -she notes generally feeling good \par -she notes decrease in smoking amount\par -she notes shoulder issues have improved \par -she notes s/p Gelsyn shots for knee \par -she notes s/p cervical epidural for cervicalgia and shoulder pain \par -she notes vision is stable \par -she notes sinus issues onset 4 days \par -she notes expelling mucus in the morning \par -she denies use of nasal sprays \par -she notes dermatitis improved with Vaseline \par \par -she denies any headaches, nausea, vomiting, fever, chills, sweats, chest pain, chest pressure, coughing, palpitations, constipation, diarrhea, dizziness, dysphagia, heartburn, reflux, itchy eyes, itchy ears, arthralgias, myalgias, or sour taste in the mouth.

## 2022-11-25 NOTE — ADDENDUM
[FreeTextEntry1] : Documented by Raffi Luevano acting as a scribe for Dr. Jose Miguel Francisco on 11/25/2022 .\par \par All medical record entries made by the Scribe were at my, Dr. Jose Miguel Francisco's, direction and personally dictated by me on 11/25/2022. I have reviewed the chart and agree that the record accurately reflects my personal performance of the history, physical exam, assessment and plan. I have also personally directed, reviewed, and agree with the discharge instructions.

## 2022-12-17 ENCOUNTER — RX RENEWAL (OUTPATIENT)
Age: 52
End: 2022-12-17

## 2022-12-17 RX ORDER — MONTELUKAST 10 MG/1
10 TABLET, FILM COATED ORAL
Qty: 90 | Refills: 1 | Status: ACTIVE | COMMUNITY
Start: 2018-10-02 | End: 1900-01-01

## 2022-12-17 RX ORDER — LEVOCETIRIZINE DIHYDROCHLORIDE 5 MG/1
5 TABLET ORAL
Qty: 90 | Refills: 1 | Status: ACTIVE | COMMUNITY
Start: 2018-10-02 | End: 1900-01-01

## 2022-12-18 ENCOUNTER — RX RENEWAL (OUTPATIENT)
Age: 52
End: 2022-12-18

## 2022-12-28 ENCOUNTER — NON-APPOINTMENT (OUTPATIENT)
Age: 52
End: 2022-12-28

## 2022-12-28 DIAGNOSIS — Z11.59 ENCOUNTER FOR SCREENING FOR OTHER VIRAL DISEASES: ICD-10-CM

## 2022-12-29 ENCOUNTER — TRANSCRIPTION ENCOUNTER (OUTPATIENT)
Age: 52
End: 2022-12-29

## 2022-12-30 ENCOUNTER — APPOINTMENT (OUTPATIENT)
Dept: PULMONOLOGY | Facility: CLINIC | Age: 52
End: 2022-12-30
Payer: COMMERCIAL

## 2022-12-30 PROCEDURE — 99214 OFFICE O/P EST MOD 30 MIN: CPT | Mod: 95

## 2022-12-30 RX ORDER — AMOXICILLIN AND CLAVULANATE POTASSIUM 875; 125 MG/1; MG/1
875-125 TABLET, COATED ORAL
Qty: 28 | Refills: 0 | Status: DISCONTINUED | COMMUNITY
Start: 2022-11-25 | End: 2022-12-30

## 2022-12-30 NOTE — ADDENDUM
[FreeTextEntry1] : Documented by Isai Miranda acting as a scribe for Dr. Jose Miguel Francisco on (12/30/2022).\par \par All medical record entries made by the Scribe were at my, Dr. Jose Miguel Francisco's, direction and personally dictated by me on (12/30/2022). I have reviewed the chart and agree that the record accurately reflects my personal performance of the history, physical exam, assessment and plan. I have personally directed, reviewed, and agree with the discharge instructions.

## 2022-12-30 NOTE — ASSESSMENT
[FreeTextEntry1] : Ms. Mars is a 52 year old female who has a history of asthma, allergy, hyperthyroid, exfoliating dermatitis \par  and GERD. s/p COVID-19 1/2021 She continues to actively smoke (noncompliant), nicotine addiction, now COVID-19 12/2022 (sinus sx) \par \par Her shortness of breath is multifactorial due to:\par -moderate persistent asthma\par -poor breathing mechanics\par -deconditioning \par -overweight\par \par Problem 1A: Covid 19 infection 1/2021, 12/2021, 12/2022\par - S/p Covid 19 vaccine (Pfizer) x2 5/2021\par -Not candidate for MAB \par -OTC Vitamin C 500mg BID \par -OTC Quercetin 250-500mg BID \par -OTC Zinc 75-100mg per day \par -OTC Melatonin 1 or 2mg a night \par -OTC Vitamin D 1-4000mg per day \par -OTC Tonic Water 8oz per day\par -Water 0.5-1 gallon per day\par \par Problem 1: moderate persistent asthma/ Co Asthma (quiet)\par - Continue Albuterol nebulizer at least BID up to QiD (gargle and spit after use)\par -Continue Breztri 2 puffs BID \par -continue to use Ventolin PRN and before exercise (receiving generic)\par -continue to use Singulair 10 mg before bed \par - Script given for prednisone to be used in case of *emergency*; pt was instructed to consult with the office *before* taking any prednisone \par -Asthma is believed to be caused by inherited (genetic) and environmental factor, but its exact cause is unknown. Asthma may be triggered by allergens, lung infections, or irritants in the air. Asthma triggers are different for each person\par -Inhaler technique reviewed as well as oral hygiene techniques reviewed with patient. Avoidance of cold air, extremes of temperature, rescue inhaler should be used before exercise. Order of medication reviewed with patient. Recommended use of a cool mist humidifier in the bedroom.\par \par problem 2: smoking cessation (smoking cessation discussed with patient 12/30/2022) \par -acapella device for mucus clearance  \par -Discussed for five minutes with the patient the risks/associations with continued smoking including COPD, emphysema, shortness of breath, renal cancer, bladder cancer, stroke risk, cardiac disease, etc. Smoking cessation was discussed at length and highly encouraged. Various options to aid cessation was discussed including use of Chantix, Nicotrol, nicotine products, laser therapy, hypnosis, Wellbutrin, etc.\par \par Problem 3A: COVID-19 12/2022\par - Dexamethasone 6mg/Day x 10 days\par - Hydration \par -add Kerry [place] Cold & Sinus \par -recommended saline \par Recommend supportive care including antipyretics, fluids, OTC cough/cold medications if age-appropriate, and nasal saline followed by nasal suction. Return if symptoms worsen or persist. \par \par problem 3: allergic rhinitis\par -continue to use nasal saline PRN\par -continue Clarinex 5 mg QAM\par -continue Xyzal 5 mg before bed  \par -Environmental measures for allergies were encouraged including mattress and pillow cover, air purifier, and environmental controls. \par \par problem 4: poor breathing mechanics\par -Proper breathing techniques were reviewed with an emphasis of exhalation. Patient instructed to breath in for 1 second and out for four seconds. Patient was encouraged to not talk while walking.\par \par problem 5: overweight/deconditioning (Improved)\par -recommended yoga and wellness visits\par -Weight loss, exercise, and diet control were discussed and are highly encouraged. Treatment options were given such as, aqua therapy, and contacting a nutritionist. Recommended to use the elliptical, stationary bike, less use of treadmill. Obesity is associated with worsening asthma, shortness of breath, and potential for cardiac disease, diabetes, and other underlying medical conditions. \par \par problem 7: constipation\par -recommended to use Dr. Lomax's Intestinal Formula #1 \par \par problem 8: health maintenance \par -s/p COVID-19 1/2021 - stable\par - S/p Covid 19 vaccine (Pfizer) x2 5/2021\par -recommended yearly flu shot after October 15, refused \par -recommended strep pneumonia vaccines: Prevnar-13 vaccine, followed by Pneumo vaccine 23 one year following\par -recommended early intervention for URIs\par -recommended regular osteoporosis evaluations\par -recommended early dermatological evaluations\par -recommended after the age of 50 to the age of 70, colonoscopy every 5 years \par \par F/U in 4 months with Spi \par She is encouraged to call with any changes, concerns, or questions.

## 2022-12-30 NOTE — HISTORY OF PRESENT ILLNESS
[Home] : at home, [unfilled] , at the time of the visit. [Medical Office: ([facility])___] : at the medical office located in  [Verbal consent obtained from patient] : the patient, [unfilled] [FreeTextEntry1] : Ms. Mars is a 52 year old female presenting to the office for a follow up visit s/p asthmatic bronchitis, allergic rhinitis, asthma, nicotine addiction, and shortness of breath. Her chief complaint is \par - she notes her sx started as congestion and first thought it was allergies\par - she notes 12/26 her sx got little worse and knew it was not allergies\par - she notes it didn't dropped into her chest \par - she notes her lungs are clear\par - she notes nasal congestion\par - she notes being able to sleep \par - she denies any visual issues \par \par \par -she denies any nausea, vomiting, fever, chills, sweats, chest pain, chest pressure, palpitations, constipation, diarrhea, dizziness, dysphagia, heartburn, reflux, itchy eyes, itchy ears, leg swelling, leg pain, arthralgias, myalgias, or sour taste in the mouth.

## 2022-12-30 NOTE — REASON FOR VISIT
[Follow-Up] : a follow-up visit [TextBox_44] : via video call - allergies, s/p COVID-19 1/2021 and 12/2021, asthma, GERD, SOB,allergy

## 2023-02-21 NOTE — H&P PST ADULT - CIGARETTES, PACKS/DAY
1 Oral Minoxidil Counseling- I discussed with the patient the risks of oral minoxidil including but not limited to shortness of breath, swelling of the feet or ankles, dizziness, lightheadedness, unwanted hair growth and allergic reaction.  The patient verbalized understanding of the proper use and possible adverse effects of oral minoxidil.  All of the patient's questions and concerns were addressed.

## 2023-02-25 ENCOUNTER — APPOINTMENT (OUTPATIENT)
Dept: MRI IMAGING | Facility: CLINIC | Age: 53
End: 2023-02-25

## 2023-05-19 ENCOUNTER — APPOINTMENT (OUTPATIENT)
Dept: PULMONOLOGY | Facility: CLINIC | Age: 53
End: 2023-05-19
Payer: COMMERCIAL

## 2023-05-19 VITALS
OXYGEN SATURATION: 91 % | SYSTOLIC BLOOD PRESSURE: 130 MMHG | DIASTOLIC BLOOD PRESSURE: 80 MMHG | WEIGHT: 154 LBS | HEIGHT: 61 IN | BODY MASS INDEX: 29.07 KG/M2 | TEMPERATURE: 97.2 F | HEART RATE: 111 BPM | RESPIRATION RATE: 16 BRPM

## 2023-05-19 DIAGNOSIS — U07.1 COVID-19: ICD-10-CM

## 2023-05-19 PROCEDURE — 95012 NITRIC OXIDE EXP GAS DETER: CPT

## 2023-05-19 PROCEDURE — 99406 BEHAV CHNG SMOKING 3-10 MIN: CPT | Mod: 25

## 2023-05-19 PROCEDURE — ZZZZZ: CPT

## 2023-05-19 PROCEDURE — 99214 OFFICE O/P EST MOD 30 MIN: CPT | Mod: 25

## 2023-05-19 PROCEDURE — 94010 BREATHING CAPACITY TEST: CPT

## 2023-05-19 PROCEDURE — G0296 VISIT TO DETERM LDCT ELIG: CPT

## 2023-05-19 PROCEDURE — 94729 DIFFUSING CAPACITY: CPT

## 2023-05-19 PROCEDURE — 94727 GAS DIL/WSHOT DETER LNG VOL: CPT

## 2023-05-19 NOTE — ASSESSMENT
[FreeTextEntry1] : Ms. Mars is a 53 year old female who has a history of asthma, allergy, hyperthyroid, exfoliating dermatitis \par  and GERD. s/p COVID-19 1/2021 She continues to actively smoke (noncompliant), nicotine addiction, now COVID-19 12/2022 (sinus sx) -now recovering from spinal surgery HSS L5 S1, still smoking\par \par Her shortness of breath is multifactorial due to:\par -moderate persistent asthma\par -poor breathing mechanics\par -deconditioning \par -overweight\par \par Problem 1A: Covid 19 infection 1/2021, 12/2021, 12/2022\par - S/p Covid 19 vaccine (Pfizer) x2 5/2021\par -Not candidate for MAB \par -OTC Vitamin C 500mg BID \par -OTC Quercetin 250-500mg BID \par -OTC Zinc 75-100mg per day \par -OTC Melatonin 1 or 2mg a night \par -OTC Vitamin D 1-4000mg per day \par -OTC Tonic Water 8oz per day\par -Water 0.5-1 gallon per day\par \par Problem 1: moderate persistent asthma/ Co Asthma (quiet)\par - Continue Albuterol nebulizer at least BID up to QiD (gargle and spit after use)\par -Continue Breztri 2 puffs BID \par -continue to use Ventolin PRN and before exercise (receiving generic)\par -continue to use Singulair 10 mg before bed \par - Script given for prednisone to be used in case of *emergency*; pt was instructed to consult with the office *before* taking any prednisone \par -Asthma is believed to be caused by inherited (genetic) and environmental factor, but its exact cause is unknown. Asthma may be triggered by allergens, lung infections, or irritants in the air. Asthma triggers are different for each person\par -Inhaler technique reviewed as well as oral hygiene techniques reviewed with patient. Avoidance of cold air, extremes of temperature, rescue inhaler should be used before exercise. Order of medication reviewed with patient. Recommended use of a cool mist humidifier in the bedroom.\par \par problem 2: smoking cessation (smoking cessation discussed with patient 05/19/2023)\par -acapella device for mucus clearance  \par -Discussed for five minutes with the patient the risks/associations with continued smoking including COPD, emphysema, shortness of breath, renal cancer, bladder cancer, stroke risk, cardiac disease, etc. Smoking cessation was discussed at length and highly encouraged. Various options to aid cessation was discussed including use of Chantix, Nicotrol, nicotine products, laser therapy, hypnosis, Wellbutrin, etc.\par \par Problem 3A: COVID-19 12/2022 (resolved)\par - Dexamethasone 6mg/Day x 10 days\par - Hydration \par -sp Kerry [place] Cold & Sinus \par -recommended saline \par Recommend supportive care including antipyretics, fluids, OTC cough/cold medications if age-appropriate, and nasal saline followed by nasal suction. Return if symptoms worsen or persist. \par \par Problem 2A: Lung CA screening ( 05/19/2023 )\par -complete lung CA screening CT\par -Lung cancer screening is recommended for people between the ages of 55 and 80 with prior 30+ pack year smoking histories. There is irrefutable evidence for realization of lung cancer screening based on two large randomized control trials demonstrating relative reduction in lung cancer mortality for patients undergoing low-dose CT scanning. Risks and benefits reviewed with the patient. \par \par problem 3: allergic rhinitis\par -continue to use nasal saline PRN\par -continue Clarinex 5 mg QAM\par -continue Xyzal 5 mg before bed  \par -Environmental measures for allergies were encouraged including mattress and pillow cover, air purifier, and environmental controls. \par \par problem 4: poor breathing mechanics\par -Proper breathing techniques were reviewed with an emphasis of exhalation. Patient instructed to breath in for 1 second and out for four seconds. Patient was encouraged to not talk while walking.\par \par problem 5: overweight/deconditioning (Improved)\par -recommended yoga and wellness visits\par -Weight loss, exercise, and diet control were discussed and are highly encouraged. Treatment options were given such as, aqua therapy, and contacting a nutritionist. Recommended to use the elliptical, stationary bike, less use of treadmill. Obesity is associated with worsening asthma, shortness of breath, and potential for cardiac disease, diabetes, and other underlying medical conditions. \par \par problem 7: constipation\par -recommended to use Dr. Lomax's Intestinal Formula #1 \par \par problem 8: health maintenance \par -s/p COVID-19 1/2021 - stable\par - S/p Covid 19 vaccine (Pfizer) x2 5/2021\par -recommended yearly flu shot after October 15, refused \par -recommended strep pneumonia vaccines: Prevnar-13 vaccine, followed by Pneumo vaccine 23 one year following\par -recommended early intervention for URIs\par -recommended regular osteoporosis evaluations\par -recommended early dermatological evaluations\par -recommended after the age of 50 to the age of 70, colonoscopy every 5 years \par \par F/U in 4 months with Spi \par She is encouraged to call with any changes, concerns, or questions.

## 2023-05-19 NOTE — PROCEDURE
[FreeTextEntry1] : Full PFT reveals normal flows; FEV1 was 2.24L which is 97% of predicted with very mild obstructive dysfunction at mid-low volumes; normal lung volumes; normal diffusion at 22.1, which is 108% of predicted; normal flow volume loop. \par PFTs were performed to evaluate for  SOB, asthma/COPD\par \par FENO was 7; a normal value being less than 25\par Fractional exhaled nitric oxide (FENO) is regarded as a simple, noninvasive method for assessing eosinophilic airway inflammation. Produced by a variety of cells within the lung, nitric oxide (NO) concentrations are generally low in healthy individuals. However, high concentrations of NO appear to be involved in nonspecific host defense mechanisms and chronic inflammatory diseases such as asthma. The American Thoracic Society (ATS) therefore has recommended using FENO to aid in the diagnosis and monitoring of eosinophilic airway inflammation and asthma, and for identifying steroid responsive individuals whose chronic respiratory symptoms may be caused by airway inflammation. \par

## 2023-05-19 NOTE — ADDENDUM
[FreeTextEntry1] : Documented by HALEY Goetz acting as a scribe for Dr. Jose Miguel Francisco on 05/19/2023 .\par \par All medical record entries made by the Scribe were at my, Dr. Jose Miguel Francisco's, direction and personally dictated by me on 05/19/2023. I have reviewed the chart and agree that the record accurately reflects my personal performance of the history, physical exam, assessment and plan. I have also personally directed, reviewed, and agree with the discharge instructions.\par

## 2023-05-19 NOTE — HISTORY OF PRESENT ILLNESS
[FreeTextEntry1] : Ms. Mars is a 53 year old female presenting to the office for a follow up visit s/p asthmatic bronchitis, allergic rhinitis, asthma, nicotine addiction, and shortness of breath. Her chief complaint is \par \par \par -s/p 3/2023 emergent surgery HSS (L5, S1 laminotomy)\par -she notes recent bout of breathing issues where she required frequent use of rescue inhalers which has improved\par -she notes exercising (stretches)\par -she notes her breathing is good\par -she notes poor quality of sleep\par \par -she denies any headaches, nausea, emesis, fever, chills, sweats, chest pain, chest pressure, coughing, wheezing, palpitations, constipation, diarrhea, vertigo, dysphagia, heartburn, reflux, itchy eyes, itchy ears, leg swelling, arthralgias, or sour taste in the mouth.\par

## 2023-05-19 NOTE — PHYSICAL EXAM
[No Acute Distress] : no acute distress [Normal Oropharynx] : normal oropharynx [Normal Appearance] : normal appearance [No Neck Mass] : no neck mass [Normal Rate/Rhythm] : normal rate/rhythm [Normal S1, S2] : normal s1, s2 [No Murmurs] : no murmurs [No Resp Distress] : no resp distress [Clear to Auscultation Bilaterally] : clear to auscultation bilaterally [No Abnormalities] : no abnormalities [Benign] : benign [Normal Gait] : normal gait [No Clubbing] : no clubbing [No Cyanosis] : no cyanosis [No Edema] : no edema [FROM] : FROM [Normal Color/ Pigmentation] : normal color/ pigmentation [No Focal Deficits] : no focal deficits [Oriented x3] : oriented x3 [Normal Affect] : normal affect [III] : Mallampati Class: III [TextBox_68] : I:E ratio 1:3; clear

## 2023-07-14 ENCOUNTER — RX RENEWAL (OUTPATIENT)
Age: 53
End: 2023-07-14

## 2023-09-17 NOTE — REASON FOR VISIT
Well appearing, awake, alert, oriented to person, place, time/situation and in no apparent distress. Patient is shaky. normal... [Follow-Up] : a follow-up visit [TextBox_44] : allergies, s/p COVID-19 1/2021 and 12/2021, asthma, GERD, SOB,allergy

## 2023-09-29 ENCOUNTER — APPOINTMENT (OUTPATIENT)
Dept: PULMONOLOGY | Facility: CLINIC | Age: 53
End: 2023-09-29
Payer: COMMERCIAL

## 2023-09-29 VITALS
BODY MASS INDEX: 28.89 KG/M2 | HEIGHT: 61 IN | TEMPERATURE: 97.1 F | RESPIRATION RATE: 16 BRPM | DIASTOLIC BLOOD PRESSURE: 72 MMHG | OXYGEN SATURATION: 98 % | SYSTOLIC BLOOD PRESSURE: 116 MMHG | WEIGHT: 153 LBS | HEART RATE: 93 BPM

## 2023-09-29 DIAGNOSIS — M54.9 DORSALGIA, UNSPECIFIED: ICD-10-CM

## 2023-09-29 DIAGNOSIS — G89.29 DORSALGIA, UNSPECIFIED: ICD-10-CM

## 2023-09-29 PROCEDURE — 99406 BEHAV CHNG SMOKING 3-10 MIN: CPT | Mod: 25

## 2023-09-29 PROCEDURE — 95012 NITRIC OXIDE EXP GAS DETER: CPT

## 2023-09-29 PROCEDURE — 99214 OFFICE O/P EST MOD 30 MIN: CPT | Mod: 25

## 2023-09-29 PROCEDURE — 94010 BREATHING CAPACITY TEST: CPT

## 2023-09-29 RX ORDER — TIZANIDINE HYDROCHLORIDE 6 MG/1
6 CAPSULE ORAL
Qty: 120 | Refills: 1 | Status: ACTIVE | COMMUNITY
Start: 2023-09-29 | End: 1900-01-01

## 2023-09-29 RX ORDER — PREDNISONE 10 MG/1
10 TABLET ORAL
Qty: 50 | Refills: 0 | Status: ACTIVE | COMMUNITY
Start: 2023-09-29 | End: 1900-01-01

## 2023-09-29 RX ORDER — GABAPENTIN 100 MG/1
100 CAPSULE ORAL 3 TIMES DAILY
Qty: 90 | Refills: 2 | Status: DISCONTINUED | COMMUNITY
Start: 2023-05-19 | End: 2023-09-29

## 2023-11-01 ENCOUNTER — APPOINTMENT (OUTPATIENT)
Dept: ENDOCRINOLOGY | Facility: CLINIC | Age: 53
End: 2023-11-01
Payer: COMMERCIAL

## 2023-11-01 VITALS
HEART RATE: 83 BPM | HEIGHT: 61 IN | BODY MASS INDEX: 30.58 KG/M2 | SYSTOLIC BLOOD PRESSURE: 118 MMHG | DIASTOLIC BLOOD PRESSURE: 80 MMHG | OXYGEN SATURATION: 98 % | WEIGHT: 162 LBS

## 2023-11-01 PROCEDURE — 99204 OFFICE O/P NEW MOD 45 MIN: CPT

## 2023-11-02 LAB
ALBUMIN SERPL ELPH-MCNC: 4.6 G/DL
ALP BLD-CCNC: 162 U/L
ALT SERPL-CCNC: 15 U/L
ANION GAP SERPL CALC-SCNC: 12 MMOL/L
AST SERPL-CCNC: 13 U/L
BASOPHILS # BLD AUTO: 0.09 K/UL
BASOPHILS NFR BLD AUTO: 0.9 %
BILIRUB SERPL-MCNC: 0.3 MG/DL
BUN SERPL-MCNC: 12 MG/DL
CALCIUM SERPL-MCNC: 9.6 MG/DL
CHLORIDE SERPL-SCNC: 104 MMOL/L
CO2 SERPL-SCNC: 24 MMOL/L
CREAT SERPL-MCNC: 0.8 MG/DL
EGFR: 88 ML/MIN/1.73M2
EOSINOPHIL # BLD AUTO: 0.19 K/UL
EOSINOPHIL NFR BLD AUTO: 1.9 %
GLUCOSE SERPL-MCNC: 104 MG/DL
HCT VFR BLD CALC: 47.4 %
HGB BLD-MCNC: 15.5 G/DL
IMM GRANULOCYTES NFR BLD AUTO: 0.3 %
LYMPHOCYTES # BLD AUTO: 2.35 K/UL
LYMPHOCYTES NFR BLD AUTO: 23.3 %
MAN DIFF?: NORMAL
MCHC RBC-ENTMCNC: 27.9 PG
MCHC RBC-ENTMCNC: 32.7 GM/DL
MCV RBC AUTO: 85.3 FL
MONOCYTES # BLD AUTO: 0.53 K/UL
MONOCYTES NFR BLD AUTO: 5.3 %
NEUTROPHILS # BLD AUTO: 6.88 K/UL
NEUTROPHILS NFR BLD AUTO: 68.3 %
PLATELET # BLD AUTO: 309 K/UL
POTASSIUM SERPL-SCNC: 4.4 MMOL/L
PROT SERPL-MCNC: 7.5 G/DL
RBC # BLD: 5.56 M/UL
RBC # FLD: 14 %
SODIUM SERPL-SCNC: 140 MMOL/L
T3 SERPL-MCNC: 134 NG/DL
T4 FREE SERPL-MCNC: 1 NG/DL
TSH SERPL-ACNC: 0.01 UIU/ML
WBC # FLD AUTO: 10.07 K/UL

## 2023-11-06 LAB — TSI ACT/NOR SER: 1.35 IU/L

## 2023-11-09 ENCOUNTER — APPOINTMENT (OUTPATIENT)
Dept: SURGERY | Facility: CLINIC | Age: 53
End: 2023-11-09
Payer: COMMERCIAL

## 2023-11-09 VITALS
HEIGHT: 63 IN | SYSTOLIC BLOOD PRESSURE: 118 MMHG | WEIGHT: 160 LBS | DIASTOLIC BLOOD PRESSURE: 80 MMHG | BODY MASS INDEX: 28.35 KG/M2

## 2023-11-09 PROCEDURE — 99204 OFFICE O/P NEW MOD 45 MIN: CPT

## 2023-11-13 LAB — TSH RECEPTOR AB: 3.26 IU/L

## 2023-11-13 NOTE — ED ADULT NURSE NOTE - CAS EDN DISCHARGE ASSESSMENT
Patient ID band present and verified. Patient contact is not present. Alert and oriented to person, place and time

## 2023-11-27 ENCOUNTER — TRANSCRIPTION ENCOUNTER (OUTPATIENT)
Age: 53
End: 2023-11-27

## 2023-12-14 ENCOUNTER — APPOINTMENT (OUTPATIENT)
Dept: ULTRASOUND IMAGING | Facility: CLINIC | Age: 53
End: 2023-12-14
Payer: COMMERCIAL

## 2023-12-14 ENCOUNTER — OUTPATIENT (OUTPATIENT)
Dept: OUTPATIENT SERVICES | Facility: HOSPITAL | Age: 53
LOS: 1 days | End: 2023-12-14
Payer: COMMERCIAL

## 2023-12-14 DIAGNOSIS — Z98.890 OTHER SPECIFIED POSTPROCEDURAL STATES: Chronic | ICD-10-CM

## 2023-12-14 DIAGNOSIS — Z98.891 HISTORY OF UTERINE SCAR FROM PREVIOUS SURGERY: Chronic | ICD-10-CM

## 2023-12-14 DIAGNOSIS — Z98.51 TUBAL LIGATION STATUS: Chronic | ICD-10-CM

## 2023-12-14 DIAGNOSIS — E04.1 NONTOXIC SINGLE THYROID NODULE: ICD-10-CM

## 2023-12-14 DIAGNOSIS — O03.9 COMPLETE OR UNSPECIFIED SPONTANEOUS ABORTION WITHOUT COMPLICATION: Chronic | ICD-10-CM

## 2023-12-14 PROCEDURE — 76536 US EXAM OF HEAD AND NECK: CPT

## 2023-12-14 PROCEDURE — 76536 US EXAM OF HEAD AND NECK: CPT | Mod: 26

## 2024-01-08 ENCOUNTER — APPOINTMENT (OUTPATIENT)
Dept: SURGERY | Facility: HOSPITAL | Age: 54
End: 2024-01-08

## 2024-01-11 ENCOUNTER — NON-APPOINTMENT (OUTPATIENT)
Age: 54
End: 2024-01-11

## 2024-01-17 ENCOUNTER — APPOINTMENT (OUTPATIENT)
Dept: SURGERY | Facility: HOSPITAL | Age: 54
End: 2024-01-17

## 2024-02-02 ENCOUNTER — APPOINTMENT (OUTPATIENT)
Dept: PULMONOLOGY | Facility: CLINIC | Age: 54
End: 2024-02-02
Payer: COMMERCIAL

## 2024-02-02 ENCOUNTER — RX RENEWAL (OUTPATIENT)
Age: 54
End: 2024-02-02

## 2024-02-02 VITALS
SYSTOLIC BLOOD PRESSURE: 126 MMHG | OXYGEN SATURATION: 97 % | BODY MASS INDEX: 30.12 KG/M2 | RESPIRATION RATE: 16 BRPM | HEART RATE: 78 BPM | DIASTOLIC BLOOD PRESSURE: 70 MMHG | TEMPERATURE: 96.3 F | WEIGHT: 170 LBS | HEIGHT: 63 IN

## 2024-02-02 DIAGNOSIS — Z01.811 ENCOUNTER FOR PREPROCEDURAL RESPIRATORY EXAMINATION: ICD-10-CM

## 2024-02-02 DIAGNOSIS — Z12.2 ENCOUNTER FOR SCREENING FOR MALIGNANT NEOPLASM OF RESPIRATORY ORGANS: ICD-10-CM

## 2024-02-02 PROCEDURE — 94618 PULMONARY STRESS TESTING: CPT

## 2024-02-02 PROCEDURE — 94010 BREATHING CAPACITY TEST: CPT

## 2024-02-02 PROCEDURE — 99214 OFFICE O/P EST MOD 30 MIN: CPT | Mod: 25

## 2024-02-02 PROCEDURE — 95012 NITRIC OXIDE EXP GAS DETER: CPT

## 2024-02-02 RX ORDER — DEXAMETHASONE 6 MG/1
6 TABLET ORAL DAILY
Qty: 10 | Refills: 0 | Status: DISCONTINUED | COMMUNITY
Start: 2022-12-29 | End: 2024-02-02

## 2024-02-02 RX ORDER — PREDNISONE 10 MG/1
10 TABLET ORAL
Qty: 50 | Refills: 0 | Status: ACTIVE | COMMUNITY
Start: 2022-11-25 | End: 1900-01-01

## 2024-02-02 NOTE — PROCEDURE
[FreeTextEntry1] : PFTs revealed mild obstructive dysfunction; FEV1 was 1.89L, which is 72.2% of predicted; normal flow volume loop. PFTs were performed to evaluate for SOB, asthma  6 minute walk test reveals a low saturation of 96% with no evidence of dyspnea or fatigue; walked 391.2 meters   ECHO (1/2024) revealed LVEF 62%, minimal aortic stenosis, mild MVR, no evidence of PAH  FENO was 8; a normal value being less than 25 Fractional exhaled nitric oxide (FENO) is regarded as a simple, noninvasive method for assessing eosinophilic airway inflammation. Produced by a variety of cells within the lung, nitric oxide (NO) concentrations are generally low in healthy individuals. However, high concentrations of NO appear to be involved in nonspecific host defense mechanisms and chronic inflammatory diseases such as asthma. The American Thoracic Society (ATS) therefore has recommended using FENO to aid in the diagnosis and monitoring of eosinophilic airway inflammation and asthma, and for identifying steroid responsive individuals whose chronic respiratory symptoms may be caused by airway inflammation.

## 2024-02-02 NOTE — REASON FOR VISIT
[Follow-Up] : a follow-up visit [TextBox_44] : allergies, s/p COVID-19 1/2021 and 12/2021, asthma, GERD, SOB, allergy

## 2024-02-02 NOTE — ADDENDUM
[FreeTextEntry1] : Documented by Kati Horowitz acting as a scribe for Dr. Jose Miguel Francisco on 02/02/2024. All medical record entries made by the Scribe were at my, Dr. Jose Miguel Francisco's, direction and personally dictated by me on 02/02/2024. I have reviewed the chart and agree that the record accurately reflects my personal performance of the history, physical exam, assessment and plan. I have also personally directed, reviewed, and agree with the discharge instructions.

## 2024-02-02 NOTE — ASSESSMENT
[FreeTextEntry1] : Ms. Mars is a 54 year old female who has a history of asthma, allergy, hyperthyroid, exfoliating dermatitis  and GERD. s/p COVID-19 1/2021 She continues to actively smoke (noncompliant), nicotine addiction, now COVID-19 12/2022 (sinus sx) -now recovering from spinal surgery HSS L5 S1, (still) smoking - still (less than 1 pack)- pending total thyroidectomy  ******************************PRE-OP CLEARANCE for Total Thyroidectomy**************************** -at this point in time there are no absolute pulmonary contraindications to go forward with the planned procedure -at the time of surgery she should have optimal pain control, incentive spirometry, early ambulation, DVT and GI prophylaxis   Her shortness of breath is multifactorial due to: -moderate persistent asthma -poor breathing mechanics -deconditioning -overweight  Problem 1: moderate persistent asthma/ Co Asthma (quiet) - Continue Albuterol nebulizer at least BID up to QiD (gargle and spit after use) -Continue Breztri 2 puffs BID -continue to use Ventolin PRN and before exercise (receiving generic) -continue to use Singulair 10 mg before bed - Script given for prednisone to be used in case of *emergency*; pt was instructed to consult with the office *before* taking any prednisone -Asthma is believed to be caused by inherited (genetic) and environmental factor, but its exact cause is unknown. Asthma may be triggered by allergens, lung infections, or irritants in the air. Asthma triggers are different for each person -Inhaler technique reviewed as well as oral hygiene techniques reviewed with patient. Avoidance of cold air, extremes of temperature, rescue inhaler should be used before exercise. Order of medication reviewed with patient. Recommended use of a cool mist humidifier in the bedroom.  Problem 1A: Covid 19 infection 1/2021, 12/2021, 12/2022 (resolved) - S/p Covid 19 vaccine (Pfizer) x2 5/2021 -Not candidate for MAB -OTC Vitamin C 500mg BID -OTC Quercetin 250-500mg BID -OTC Zinc 75-100mg per day -OTC Melatonin 1 or 2mg a night -OTC Vitamin D 1-4000mg per day -OTC Tonic Water 8oz per day -Water 0.5-1 gallon per day  problem 2: smoking cessation (smoking cessation discussed with patient 02/02/2024) -acapella device for mucus clearance -Discussed for five minutes with the patient the risks/associations with continued smoking including COPD, emphysema, shortness of breath, renal cancer, bladder cancer, stroke risk, cardiac disease, etc. Smoking cessation was discussed at length and highly encouraged. Various options to aid cessation was discussed including use of Chantix, Nicotrol, nicotine products, laser therapy, hypnosis, Wellbutrin, etc.  Problem 3A: COVID-19 12/2022 (resolved) - Dexamethasone 6mg/Day x 10 days - Hydration -sp Kerry Lawrence Cold & Sinus -recommended saline Recommend supportive care including antipyretics, fluids, OTC cough/cold medications if age-appropriate, and nasal saline followed by nasal suction. Return if symptoms worsen or persist.  Problem 2A: Lung CA screening ( 09/29/2023) - [place] -complete lung CA screening CT -Lung cancer screening is recommended for people between the ages of 55 and 80 with prior 30+ pack year smoking histories. There is irrefutable evidence for realization of lung cancer screening based on two large randomized control trials demonstrating relative reduction in lung cancer mortality for patients undergoing low-dose CT scanning. Risks and benefits reviewed with the patient.  problem 3: allergic rhinitis -continue to use nasal saline PRN -add Vicks Sinex saline nasal spray  -continue Clarinex 5 mg QAM -continue Xyzal 5 mg before bed -Environmental measures for allergies were encouraged including mattress and pillow cover, air purifier, and environmental controls.  problem 4: poor breathing mechanics -Proper breathing techniques were reviewed with an emphasis of exhalation. Patient instructed to breath in for 1 second and out for four seconds. Patient was encouraged to not talk while walking.  problem 5: overweight/deconditioning (Improved) -recommended yoga and wellness visits -recommended Berberine OTC supplement for visceral fat loss  -Weight loss, exercise, and diet control were discussed and are highly encouraged. Treatment options were given such as, aqua therapy, and contacting a nutritionist. Recommended to use the elliptical, stationary bike, less use of treadmill. Obesity is associated with worsening asthma, shortness of breath, and potential for cardiac disease, diabetes, and other underlying medical conditions.  problem 7: constipation -recommended to use Dr. Lomax's Intestinal Formula #1  problem 8: health maintenance -BP normal 02/02/2024 (126/70)- no pulmonary contraindications for surgery -Pain - recommended PEMF Mat -add Tizanidine 6 mg up to Q6H for back pain -s/p COVID-19 1/2021 - stable - S/p Covid 19 vaccine (Pfizer) x2 5/2021 -recommended yearly flu shot after October 15- refused 2023 -recommended strep pneumonia vaccines: Prevnar-13 vaccine, followed by Pneumo vaccine 23 one year following -recommended early intervention for URIs -recommended regular osteoporosis evaluations -recommended early dermatological evaluations -recommended after the age of 50 to the age of 70, colonoscopy every 5 years  F/P in 4 months with Spi She is encouraged to call with any changes, concerns, or questions.

## 2024-02-02 NOTE — HISTORY OF PRESENT ILLNESS
[FreeTextEntry1] : Ms. Mars is a 54 year old female presenting to the office for a follow-up pulmonary evaluation s/p asthmatic bronchitis, allergic rhinitis, asthma, nicotine addiction, and shortness of breath. Her chief complaint is   -she notes she's still smoking. She plans on quitting the day of her thyroidectomy ([facility]) -she notes residual muscle soreness s/p 2 orthopedic surgeries. She's on muscle relaxants, not painkillers -she notes bowels are regular  -she notes intermittent dysphagia due to having an enlarged thyroid -she notes she's gained weight due to being inactive s/p surgery -she notes lumbago -she notes s/p ECHO as per her PCP -she notes mild sinus congestion, and she's been using Lugol's iodine, which clears her  -she denies any headaches, nausea, emesis, fever, chills, sweats, chest pain, chest pressure, coughing, wheezing, palpitations, diarrhea, constipation, vertigo, leg swelling, itchy eyes, itchy ears, heartburn, reflux, or sour taste in the mouth.

## 2024-02-08 ENCOUNTER — APPOINTMENT (OUTPATIENT)
Dept: CT IMAGING | Facility: CLINIC | Age: 54
End: 2024-02-08
Payer: COMMERCIAL

## 2024-02-08 ENCOUNTER — OUTPATIENT (OUTPATIENT)
Dept: OUTPATIENT SERVICES | Facility: HOSPITAL | Age: 54
LOS: 1 days | End: 2024-02-08
Payer: COMMERCIAL

## 2024-02-08 DIAGNOSIS — Z98.890 OTHER SPECIFIED POSTPROCEDURAL STATES: Chronic | ICD-10-CM

## 2024-02-08 DIAGNOSIS — Z98.891 HISTORY OF UTERINE SCAR FROM PREVIOUS SURGERY: Chronic | ICD-10-CM

## 2024-02-08 DIAGNOSIS — Z98.51 TUBAL LIGATION STATUS: Chronic | ICD-10-CM

## 2024-02-08 DIAGNOSIS — Z12.2 ENCOUNTER FOR SCREENING FOR MALIGNANT NEOPLASM OF RESPIRATORY ORGANS: ICD-10-CM

## 2024-02-08 DIAGNOSIS — Z00.8 ENCOUNTER FOR OTHER GENERAL EXAMINATION: ICD-10-CM

## 2024-02-08 DIAGNOSIS — O03.9 COMPLETE OR UNSPECIFIED SPONTANEOUS ABORTION WITHOUT COMPLICATION: Chronic | ICD-10-CM

## 2024-02-08 PROCEDURE — 71250 CT THORAX DX C-: CPT

## 2024-02-08 PROCEDURE — 71250 CT THORAX DX C-: CPT | Mod: 26

## 2024-03-07 ENCOUNTER — APPOINTMENT (OUTPATIENT)
Dept: OBGYN | Facility: CLINIC | Age: 54
End: 2024-03-07
Payer: COMMERCIAL

## 2024-03-07 VITALS
DIASTOLIC BLOOD PRESSURE: 84 MMHG | WEIGHT: 181 LBS | SYSTOLIC BLOOD PRESSURE: 132 MMHG | HEIGHT: 63 IN | BODY MASS INDEX: 32.07 KG/M2

## 2024-03-07 DIAGNOSIS — Z87.891 PERSONAL HISTORY OF NICOTINE DEPENDENCE: ICD-10-CM

## 2024-03-07 DIAGNOSIS — Z80.0 FAMILY HISTORY OF MALIGNANT NEOPLASM OF DIGESTIVE ORGANS: ICD-10-CM

## 2024-03-07 DIAGNOSIS — Z80.1 FAMILY HISTORY OF MALIGNANT NEOPLASM OF TRACHEA, BRONCHUS AND LUNG: ICD-10-CM

## 2024-03-07 DIAGNOSIS — Z01.419 ENCOUNTER FOR GYNECOLOGICAL EXAMINATION (GENERAL) (ROUTINE) W/OUT ABNORMAL FINDINGS: ICD-10-CM

## 2024-03-07 PROCEDURE — G0444 DEPRESSION SCREEN ANNUAL: CPT

## 2024-03-07 PROCEDURE — 99386 PREV VISIT NEW AGE 40-64: CPT | Mod: 25

## 2024-03-07 RX ORDER — METHIMAZOLE 5 MG/1
TABLET ORAL
Refills: 0 | Status: DISCONTINUED | COMMUNITY
End: 2024-03-07

## 2024-03-07 RX ORDER — PROPRANOLOL HYDROCHLORIDE 10 MG/1
10 TABLET ORAL TWICE DAILY
Qty: 60 | Refills: 1 | Status: DISCONTINUED | COMMUNITY
Start: 2023-11-09 | End: 2024-03-07

## 2024-03-07 RX ORDER — LEVOTHYROXINE SODIUM 0.12 MG/1
125 TABLET ORAL
Refills: 0 | Status: ACTIVE | COMMUNITY
Start: 2024-03-07

## 2024-03-07 NOTE — HISTORY OF PRESENT ILLNESS
[Currently Active] : currently active [Mammogramdate] : ?3YRS AGO [PapSmeardate] : 3-4YRS AGO [TextBox_31] : HX ABL PAPS- NEVER HAD COLPO [ColonoscopyDate] : NEVER [[facility] [PGHxTotal] : 5 [PGHxABSpont] : 1

## 2024-03-07 NOTE — REASON FOR VISIT
[Annual] : an annual visit. [TextEntry] : ANNUAL EXAM.  NO GYN COMPLAINTS.  LMP APPROX 1 YR AGO.  HAD IRREG BLEEDING PRIOR TO THAT TIME-  DIAGNOSED W GRAVES DZ-  NOW S/P THYROIDECTOMY DENIES HOT FLASHES OR VAG DRYNESS RECENTLY QUIT SMOKING

## 2024-03-07 NOTE — PHYSICAL EXAM
[Alert] : alert [Appropriately responsive] : appropriately responsive [No Lymphadenopathy] : no lymphadenopathy [No Acute Distress] : no acute distress [Non-tender] : non-tender [Soft] : soft [Non-distended] : non-distended [No HSM] : No HSM [No Lesions] : no lesions [No Mass] : no mass [Oriented x3] : oriented x3 [Examination Of The Breasts] : a normal appearance [No Masses] : no breast masses were palpable [Labia Majora] : normal [Labia Minora] : normal [Normal] : normal [Uterine Adnexae] : normal [FreeTextEntry3] : S/P RECENT THYROIDECTOMY

## 2024-03-07 NOTE — PLAN
[FreeTextEntry1] : DISCUSSED WGT BEARING EXERCISE AND ADEQUATE DIETARY CA INTAKE  Patient screened for depression- no signs of clinical depression.  PHQ-9 scores reviewed over the course of the visit  5-10 minutes of face to face time spent.  Follow up with changes in mood including other symptoms of anxiety.

## 2024-03-08 LAB — HPV HIGH+LOW RISK DNA PNL CVX: NOT DETECTED

## 2024-03-12 LAB — CYTOLOGY CVX/VAG DOC THIN PREP: NORMAL

## 2024-03-24 ENCOUNTER — RX RENEWAL (OUTPATIENT)
Age: 54
End: 2024-03-24

## 2024-03-26 ENCOUNTER — APPOINTMENT (OUTPATIENT)
Dept: ENDOCRINOLOGY | Facility: CLINIC | Age: 54
End: 2024-03-26
Payer: COMMERCIAL

## 2024-03-26 VITALS
HEART RATE: 87 BPM | OXYGEN SATURATION: 97 % | WEIGHT: 181 LBS | HEIGHT: 63 IN | SYSTOLIC BLOOD PRESSURE: 122 MMHG | DIASTOLIC BLOOD PRESSURE: 84 MMHG | BODY MASS INDEX: 32.07 KG/M2

## 2024-03-26 DIAGNOSIS — Z13.1 ENCOUNTER FOR SCREENING FOR DIABETES MELLITUS: ICD-10-CM

## 2024-03-26 PROCEDURE — 99215 OFFICE O/P EST HI 40 MIN: CPT

## 2024-03-29 LAB
25(OH)D3 SERPL-MCNC: 16.3 NG/ML
ALBUMIN SERPL ELPH-MCNC: 4.6 G/DL
ALP BLD-CCNC: 169 U/L
ALT SERPL-CCNC: 18 U/L
ANION GAP SERPL CALC-SCNC: 14 MMOL/L
AST SERPL-CCNC: 16 U/L
BILIRUB SERPL-MCNC: 0.3 MG/DL
BUN SERPL-MCNC: 13 MG/DL
CALCIUM SERPL-MCNC: 9.9 MG/DL
CALCIUM SERPL-MCNC: 9.9 MG/DL
CHLORIDE SERPL-SCNC: 105 MMOL/L
CO2 SERPL-SCNC: 22 MMOL/L
CREAT SERPL-MCNC: 0.82 MG/DL
EGFR: 85 ML/MIN/1.73M2
ESTIMATED AVERAGE GLUCOSE: 143 MG/DL
GLUCOSE SERPL-MCNC: 131 MG/DL
HBA1C MFR BLD HPLC: 6.6 %
PARATHYROID HORMONE INTACT: 56 PG/ML
POTASSIUM SERPL-SCNC: 4.7 MMOL/L
PROT SERPL-MCNC: 7.7 G/DL
SODIUM SERPL-SCNC: 141 MMOL/L
T3 SERPL-MCNC: 125 NG/DL
T4 FREE SERPL-MCNC: 1.9 NG/DL
THYROGLOB AB SERPL-ACNC: <20 IU/ML
THYROGLOB SERPL-MCNC: 5.16 NG/ML
TSH SERPL-ACNC: 0.01 UIU/ML

## 2024-03-29 RX ORDER — ERGOCALCIFEROL 1.25 MG/1
1.25 MG CAPSULE, LIQUID FILLED ORAL
Qty: 6 | Refills: 0 | Status: ACTIVE | COMMUNITY
Start: 2024-03-29 | End: 1900-01-01

## 2024-03-29 RX ORDER — LEVOTHYROXINE SODIUM 0.11 MG/1
112 TABLET ORAL
Qty: 90 | Refills: 1 | Status: ACTIVE | COMMUNITY
Start: 2024-03-29 | End: 1900-01-01

## 2024-04-18 ENCOUNTER — APPOINTMENT (OUTPATIENT)
Dept: GASTROENTEROLOGY | Facility: CLINIC | Age: 54
End: 2024-04-18
Payer: COMMERCIAL

## 2024-04-18 PROCEDURE — 99202 OFFICE O/P NEW SF 15 MIN: CPT

## 2024-04-18 PROCEDURE — 99212 OFFICE O/P EST SF 10 MIN: CPT

## 2024-04-18 RX ORDER — BECLOMETHASONE DIPROPIONATE HFA 80 UG/1
80 AEROSOL, METERED RESPIRATORY (INHALATION)
Qty: 3 | Refills: 1 | Status: DISCONTINUED | COMMUNITY
Start: 2018-10-17 | End: 2024-04-18

## 2024-04-18 RX ORDER — METHOCARBAMOL 500 MG/1
TABLET, FILM COATED ORAL
Refills: 0 | Status: DISCONTINUED | COMMUNITY
End: 2024-04-18

## 2024-04-18 RX ORDER — ALBUTEROL SULFATE 90 UG/1
108 (90 BASE) AEROSOL, METERED RESPIRATORY (INHALATION)
Qty: 1 | Refills: 1 | Status: DISCONTINUED | COMMUNITY
Start: 2017-09-07 | End: 2024-04-18

## 2024-04-18 RX ORDER — ALBUTEROL SULFATE 90 UG/1
108 (90 BASE) AEROSOL, METERED RESPIRATORY (INHALATION)
Qty: 3 | Refills: 1 | Status: DISCONTINUED | COMMUNITY
Start: 2019-08-26 | End: 2024-04-18

## 2024-04-18 RX ORDER — TIZANIDINE 4 MG/1
4 TABLET ORAL
Qty: 90 | Refills: 1 | Status: DISCONTINUED | COMMUNITY
Start: 2024-02-02 | End: 2024-04-18

## 2024-04-18 RX ORDER — ALBUTEROL SULFATE 90 UG/1
108 (90 BASE) AEROSOL, METERED RESPIRATORY (INHALATION) EVERY 6 HOURS
Qty: 1 | Refills: 3 | Status: DISCONTINUED | COMMUNITY
Start: 2018-10-02 | End: 2024-04-18

## 2024-04-18 RX ORDER — SODIUM SULFATE, POTASSIUM SULFATE AND MAGNESIUM SULFATE 1.6; 3.13; 17.5 G/177ML; G/177ML; G/177ML
17.5-3.13-1.6 SOLUTION ORAL
Qty: 1 | Refills: 0 | Status: ACTIVE | COMMUNITY
Start: 2024-04-18 | End: 1900-01-01

## 2024-04-18 NOTE — ASSESSMENT
[FreeTextEntry1] : 54 y.o M w / family hx of colon ca . Requesting colonoscopy for colon ca screening . r.o colon ca vs polyps  Patient is an appropriate candidate for procedure. I explained to her the risks, alternatives, and benefits to a colonoscopy.   Risk including but not limited to bleeding, perforation, infection and adverse medication reaction. I informed her that I will be sending a prescription for  colon prep to the pharmacy on file. I also discussed with her diet and colon prep instructions prior to the procedure. All instruction will be sent to the patient. Questions were answered. He stated understanding to the above and is agreeable to proceed with the planned procedure. Patient advised to call back the office with any further questions or concerns.   Plan: Schedule procedure  Bowel prep ordered

## 2024-04-18 NOTE — REASON FOR VISIT
[Home] : at home, [unfilled] , at the time of the visit. [Medical Office: ([facility])___] : at the medical office located in  [Patient] : the patient [Self] : self [Consultation] : a consultation visit

## 2024-04-18 NOTE — PHYSICAL EXAM
[Normal] : alert, normal voice/communication, healthy appearing, no acute distress [Alert] : alert [Normal Voice/Communication] : normal voice/communication [Healthy Appearing] : healthy appearing [No Acute Distress] : no acute distress

## 2024-04-26 ENCOUNTER — APPOINTMENT (OUTPATIENT)
Dept: OBGYN | Facility: CLINIC | Age: 54
End: 2024-04-26

## 2024-05-08 NOTE — ASU PATIENT PROFILE, ADULT - FALL HARM RISK - UNIVERSAL INTERVENTIONS
Bed in lowest position, wheels locked, appropriate side rails in place/Call bell, personal items and telephone in reach/Instruct patient to call for assistance before getting out of bed or chair/Non-slip footwear when patient is out of bed/[employer] to call system/Physically safe environment - no spills, clutter or unnecessary equipment/Purposeful Proactive Rounding/Room/bathroom lighting operational, light cord in reach

## 2024-05-08 NOTE — ASU PATIENT PROFILE, ADULT - IS PATIENT PREGNANT?
Hellenandrew Gurwinder called to request a refill on her medication.       Last office visit : 2/9/2023   Next office visit : Visit date not found     Requested Prescriptions     Pending Prescriptions Disp Refills    pilocarpine (SALAGEN) 5 MG tablet [Pharmacy Med Name: pilocarpine 5 mg tablet] 270 tablet 0     Sig: TAKE ONE TABLET BY MOUTH THREE TIMES DAILY            [facility] sent/no

## 2024-05-09 ENCOUNTER — RESULT REVIEW (OUTPATIENT)
Age: 54
End: 2024-05-09

## 2024-05-09 ENCOUNTER — APPOINTMENT (OUTPATIENT)
Dept: GASTROENTEROLOGY | Facility: HOSPITAL | Age: 54
End: 2024-05-09

## 2024-05-09 ENCOUNTER — OUTPATIENT (OUTPATIENT)
Dept: OUTPATIENT SERVICES | Facility: HOSPITAL | Age: 54
LOS: 1 days | Discharge: HOME | End: 2024-05-09
Payer: COMMERCIAL

## 2024-05-09 VITALS
DIASTOLIC BLOOD PRESSURE: 89 MMHG | HEIGHT: 63 IN | SYSTOLIC BLOOD PRESSURE: 139 MMHG | TEMPERATURE: 98 F | HEART RATE: 81 BPM | RESPIRATION RATE: 13 BRPM | OXYGEN SATURATION: 97 % | WEIGHT: 184.97 LBS

## 2024-05-09 VITALS
DIASTOLIC BLOOD PRESSURE: 68 MMHG | RESPIRATION RATE: 16 BRPM | HEART RATE: 60 BPM | OXYGEN SATURATION: 100 % | SYSTOLIC BLOOD PRESSURE: 115 MMHG

## 2024-05-09 DIAGNOSIS — Z98.891 HISTORY OF UTERINE SCAR FROM PREVIOUS SURGERY: Chronic | ICD-10-CM

## 2024-05-09 DIAGNOSIS — O03.9 COMPLETE OR UNSPECIFIED SPONTANEOUS ABORTION WITHOUT COMPLICATION: Chronic | ICD-10-CM

## 2024-05-09 DIAGNOSIS — Z12.11 ENCOUNTER FOR SCREENING FOR MALIGNANT NEOPLASM OF COLON: ICD-10-CM

## 2024-05-09 DIAGNOSIS — Z98.890 OTHER SPECIFIED POSTPROCEDURAL STATES: Chronic | ICD-10-CM

## 2024-05-09 DIAGNOSIS — Z98.51 TUBAL LIGATION STATUS: Chronic | ICD-10-CM

## 2024-05-09 LAB — HCG UR QL: NEGATIVE — SIGNIFICANT CHANGE UP

## 2024-05-09 PROCEDURE — 45385 COLONOSCOPY W/LESION REMOVAL: CPT | Mod: GC

## 2024-05-09 PROCEDURE — 88305 TISSUE EXAM BY PATHOLOGIST: CPT | Mod: 26

## 2024-05-09 RX ORDER — SODIUM CHLORIDE 9 MG/ML
1000 INJECTION, SOLUTION INTRAVENOUS
Refills: 0 | Status: DISCONTINUED | OUTPATIENT
Start: 2024-05-09 | End: 2024-05-23

## 2024-05-09 RX ORDER — MONTELUKAST 4 MG/1
1 TABLET, CHEWABLE ORAL
Qty: 0 | Refills: 0 | DISCHARGE

## 2024-05-09 RX ORDER — ALBUTEROL 90 UG/1
0 AEROSOL, METERED ORAL
Qty: 0 | Refills: 0 | DISCHARGE

## 2024-05-09 RX ORDER — LEVOTHYROXINE SODIUM 125 MCG
1 TABLET ORAL
Refills: 0 | DISCHARGE

## 2024-05-09 RX ORDER — LEVOCETIRIZINE DIHYDROCHLORIDE 0.5 MG/ML
1 SOLUTION ORAL
Qty: 0 | Refills: 0 | DISCHARGE

## 2024-05-09 RX ORDER — METHIMAZOLE 10 MG/1
1 TABLET ORAL
Qty: 0 | Refills: 0 | DISCHARGE

## 2024-05-13 LAB — SURGICAL PATHOLOGY STUDY: SIGNIFICANT CHANGE UP

## 2024-05-14 ENCOUNTER — NON-APPOINTMENT (OUTPATIENT)
Age: 54
End: 2024-05-14

## 2024-05-14 DIAGNOSIS — Z12.11 ENCOUNTER FOR SCREENING FOR MALIGNANT NEOPLASM OF COLON: ICD-10-CM

## 2024-05-30 RX ORDER — MONTELUKAST 10 MG/1
10 TABLET, FILM COATED ORAL
Qty: 90 | Refills: 1 | Status: ACTIVE | COMMUNITY
Start: 2019-03-19 | End: 1900-01-01

## 2024-05-30 RX ORDER — ALBUTEROL SULFATE 90 UG/1
108 (90 BASE) INHALANT RESPIRATORY (INHALATION)
Qty: 3 | Refills: 1 | Status: ACTIVE | COMMUNITY
Start: 2021-07-06 | End: 1900-01-01

## 2024-05-30 RX ORDER — LEVOCETIRIZINE DIHYDROCHLORIDE 5 MG/1
5 TABLET ORAL
Qty: 90 | Refills: 2 | Status: ACTIVE | COMMUNITY
Start: 2019-03-19 | End: 1900-01-01

## 2024-06-11 ENCOUNTER — APPOINTMENT (OUTPATIENT)
Dept: PULMONOLOGY | Facility: CLINIC | Age: 54
End: 2024-06-11
Payer: COMMERCIAL

## 2024-06-11 VITALS
BODY MASS INDEX: 32.07 KG/M2 | OXYGEN SATURATION: 96 % | SYSTOLIC BLOOD PRESSURE: 116 MMHG | WEIGHT: 181 LBS | RESPIRATION RATE: 16 BRPM | HEIGHT: 63 IN | HEART RATE: 77 BPM | DIASTOLIC BLOOD PRESSURE: 80 MMHG | TEMPERATURE: 97.1 F

## 2024-06-11 DIAGNOSIS — R93.89 ABNORMAL FINDINGS ON DIAGNOSTIC IMAGING OF OTHER SPECIFIED BODY STRUCTURES: ICD-10-CM

## 2024-06-11 DIAGNOSIS — F17.200 NICOTINE DEPENDENCE, UNSPECIFIED, UNCOMPLICATED: ICD-10-CM

## 2024-06-11 DIAGNOSIS — J30.9 ALLERGIC RHINITIS, UNSPECIFIED: ICD-10-CM

## 2024-06-11 DIAGNOSIS — E66.3 OVERWEIGHT: ICD-10-CM

## 2024-06-11 DIAGNOSIS — J45.909 UNSPECIFIED ASTHMA, UNCOMPLICATED: ICD-10-CM

## 2024-06-11 DIAGNOSIS — E66.9 OBESITY, UNSPECIFIED: ICD-10-CM

## 2024-06-11 DIAGNOSIS — R06.02 SHORTNESS OF BREATH: ICD-10-CM

## 2024-06-11 PROCEDURE — 95012 NITRIC OXIDE EXP GAS DETER: CPT

## 2024-06-11 PROCEDURE — 94727 GAS DIL/WSHOT DETER LNG VOL: CPT

## 2024-06-11 PROCEDURE — ZZZZZ: CPT

## 2024-06-11 PROCEDURE — 94010 BREATHING CAPACITY TEST: CPT

## 2024-06-11 PROCEDURE — 94729 DIFFUSING CAPACITY: CPT

## 2024-06-11 PROCEDURE — 99214 OFFICE O/P EST MOD 30 MIN: CPT | Mod: 25

## 2024-06-11 RX ORDER — DICLOFENAC SODIUM AND MISOPROSTOL 50; 200 MG/1; UG/1
50-0.2 TABLET, DELAYED RELEASE ORAL
Qty: 60 | Refills: 5 | Status: ACTIVE | COMMUNITY
Start: 2024-06-11 | End: 1900-01-01

## 2024-06-11 NOTE — ADDENDUM
[FreeTextEntry1] :  Documented by Kristie Almonte acting as a scribe for Dr. Jose Miguel Francisco on 06/11/2024 .   All medical record entries made by the Scribe were at my, Dr. Jose Miguel Francisco's direction and personally dictated by me on 06/11/2024 . I have reviewed the chart and agree that the record accurately reflects my personal performance of the history, Physical exam, assessment, and plan. I have also personally directed, reviewed, and agree with the discharge instructions.

## 2024-06-11 NOTE — ASSESSMENT
[FreeTextEntry1] : Ms. Mars is a 54-year-old female who has a history of asthma, allergy, hyperthyroid, exfoliating dermatitis  and GERD. s/p COVID-19 1/2021 She continues to actively smoke (noncompliant), nicotine addiction, now COVID-19 12/2022 (sinus sx) -now recovering from spinal surgery HSS L5 S1, s/p smoking - still (less than 1 pack)- pending total thyroidectomy, new DM / hypothyroid and weight gain  Her shortness of breath is multifactorial due to: -moderate persistent asthma -poor breathing mechanics -deconditioning -overweight  Problem 1: moderate persistent asthma/ Co Asthma (quiet) - Continue Albuterol nebulizer at least BID up to QiD (gargle and spit after use) -Continue Breztri 2 puffs BID -continue to use Ventolin PRN and before exercise (receiving generic) -continue to use Singulair 10 mg before bed - Script given for prednisone to be used in case of *emergency*; pt was instructed to consult with the office *before* taking any prednisone -Asthma is believed to be caused by inherited (genetic) and environmental factor, but its exact cause is unknown. Asthma may be triggered by allergens, lung infections, or irritants in the air. Asthma triggers are different for each person -Inhaler technique reviewed as well as oral hygiene techniques reviewed with patient. Avoidance of cold air, extremes of temperature, rescue inhaler should be used before exercise. Order of medication reviewed with patient. Recommended use of a cool mist humidifier in the bedroom.  Problem 1A: Covid 19 infection 1/2021, 12/2021, 12/2022 (resolved) - S/p Covid 19 vaccine (Pfizer) x2 5/2021 -Not candidate for MAB -OTC Vitamin C 500mg BID -OTC Quercetin 250-500mg BID -OTC Zinc 75-100mg per day -OTC Melatonin 1 or 2mg a night -OTC Vitamin D 1-4000mg per day -OTC Tonic Water 8oz per day -Water 0.5-1 gallon per day  problem 2: smoking cessation (smoking cessation resolved 2024 -acapella device for mucus clearance -Discussed for five minutes with the patient the risks/associations with continued smoking including COPD, emphysema, shortness of breath, renal cancer, bladder cancer, stroke risk, cardiac disease, etc. Smoking cessation was discussed at length and highly encouraged. Various options to aid cessation was discussed including use of Chantix, Nicotrol, nicotine products, laser therapy, hypnosis, Wellbutrin, etc..  Problem 2A: Lung CA screening 2/2024 next 2/2025 -complete lung CA screening CT -Lung cancer screening is recommended for people between the ages of 55 and 80 with prior 30+ pack year smoking histories. There is irrefutable evidence for realization of lung cancer screening based on two large randomized control trials demonstrating relative reduction in lung cancer mortality for patients undergoing low-dose CT scanning. Risks and benefits reviewed with the patient.  problem 3: allergic rhinitis -continue to use nasal saline PRN -add Vicks Sinex saline nasal spray  -continue Clarinex 5 mg QAM -continue Xyzal 5 mg before bed -Environmental measures for allergies were encouraged including mattress and pillow cover, air purifier, and environmental controls.  Problem 3A: COVID-19 12/2022 (resolved) - Dexamethasone 6mg/Day x 10 days - Hydration -sp Kerry Verdugo Cold & Sinus -recommended saline Recommend supportive care including antipyretics, fluids, OTC cough/cold medications if age-appropriate, and nasal saline followed by nasal suction. Return if symptoms worsen or persist  problem 4: poor breathing mechanics -Proper breathing techniques were reviewed with an emphasis of exhalation. Patient instructed to breath in for 1 second and out for four seconds. Patient was encouraged to not talk while walking.  problem 5: overweight/deconditioning (Improved) -recommended yoga and wellness visits -recommended Berberine OTC supplement for visceral fat loss  -Weight loss, exercise, and diet control were discussed and are highly encouraged. Treatment options were given such as, aqua therapy, and contacting a nutritionist. Recommended to use the elliptical, stationary bike, less use of treadmill. Obesity is associated with worsening asthma, shortness of breath, and potential for cardiac disease, diabetes, and other underlying medical conditions.  problem 6: DM/ Hypothyroidism -recommend DM evaluation -recommend Endocrinologist evaluation  problem 7: constipation -recommended to use Dr. Lomax's Intestinal Formula #1  problem 8: health maintenance -BP normal 02/02/2024 (126/70)- no pulmonary contraindications for surgery -Pain - recommended PEMF Mat -add Tizanidine 6 mg up to Q6H for back pain -s/p COVID-19 1/2021 - stable - S/p Covid 19 vaccine (Pfizer) x2 5/2021 -recommended yearly flu shot after October 15- refused 2023 -recommended strep pneumonia vaccines: Prevnar-13 vaccine, followed by Pneumo vaccine 23 one year following -recommended early intervention for URIs -recommended regular osteoporosis evaluations -recommended early dermatological evaluations -recommended after the age of 50 to the age of 70, colonoscopy every 5 years  F/P in 4 months with Spi She is encouraged to call with any changes, concerns, or questions.

## 2024-06-11 NOTE — HISTORY OF PRESENT ILLNESS
[FreeTextEntry1] : Ms. Mars is a 54 year old female presenting to the office for a follow-up pulmonary evaluation s/p asthmatic bronchitis, allergic rhinitis, asthma, nicotine addiction, and shortness of breath. Her chief complaint is   -she notes she fully stopped smoking -she notes she got her Thyroid removed and gained a lot of weight (-15 lbs) -se notes her A1C levels are higher -she notes she bicycles for exercise -she notes going to [place] and does [place] and bicycle  -she notes dysphagia improved due to post Thyroidectomy recovery -she notes sleeping more but still feeling tired    -Patient denies any headaches, nausea, vomiting, fever, chills, sweats, chest pain, chest pressure, palpitations, coughing, wheezing, fatigue, diarrhea, constipation, dysphagia, arthralgias, myalgias, dizziness, leg swelling, leg pain, itchy eyes, itchy ears, dysphonia, heartburn, reflux or sour taste in mouth.

## 2024-06-11 NOTE — PROCEDURE
[FreeTextEntry1] :  Full PFT reveals normal flows; FEV1 was 2.21 L which is 96 % of predicted, normal lung volumes, normal diffusions, at 23.8 L which is 116% predicted, normal flow volume loop. PFT's for performed to evaluate for SOB.   FENO was 12; a normal value being less than 25. Fractional exhaled nitric oxide (FENO) is regarded as a simple, noninvasive method for assessing eosinophilic airway inflammation. Produced by a variety of cells within the lung, nitric oxide (NO) concentrations are generally low in healthy individuals. However, high concentrations of NO appear to be involved in nonspecific host defense mechanisms and chronic inflammatory diseases such as asthma. The American Thoracic Society (ATS) therefore has strongly recommended using FENO to aid in the assessment, management, and long-term monitoring of eosinophilic airway inflammation and asthma, and for identifying steroid responsive individuals whose chronic respiratory symptoms may be caused by airway inflammation. In their 2011 clinical practice guideline, the ATS emphasizes the importance of using FENO.

## 2024-06-12 ENCOUNTER — APPOINTMENT (OUTPATIENT)
Dept: ENDOCRINOLOGY | Facility: CLINIC | Age: 54
End: 2024-06-12
Payer: COMMERCIAL

## 2024-06-12 ENCOUNTER — LABORATORY RESULT (OUTPATIENT)
Age: 54
End: 2024-06-12

## 2024-06-12 VITALS
BODY MASS INDEX: 33.13 KG/M2 | WEIGHT: 187 LBS | SYSTOLIC BLOOD PRESSURE: 110 MMHG | HEART RATE: 79 BPM | OXYGEN SATURATION: 96 % | HEIGHT: 63 IN | DIASTOLIC BLOOD PRESSURE: 80 MMHG

## 2024-06-12 DIAGNOSIS — E05.00 THYROTOXICOSIS WITH DIFFUSE GOITER W/OUT THYROTOXIC CRISIS OR STORM: ICD-10-CM

## 2024-06-12 DIAGNOSIS — C73 MALIGNANT NEOPLASM OF THYROID GLAND: ICD-10-CM

## 2024-06-12 DIAGNOSIS — E11.9 TYPE 2 DIABETES MELLITUS W/OUT COMPLICATIONS: ICD-10-CM

## 2024-06-12 DIAGNOSIS — R73.03 PREDIABETES.: ICD-10-CM

## 2024-06-12 PROCEDURE — 99215 OFFICE O/P EST HI 40 MIN: CPT

## 2024-06-18 DIAGNOSIS — E89.0 POSTPROCEDURAL HYPOTHYROIDISM: ICD-10-CM

## 2024-06-18 LAB
ALBUMIN SERPL ELPH-MCNC: 4.6 G/DL
ALP BLD-CCNC: 146 U/L
ALT SERPL-CCNC: 19 U/L
ANION GAP SERPL CALC-SCNC: 15 MMOL/L
AST SERPL-CCNC: 14 U/L
BILIRUB SERPL-MCNC: 0.3 MG/DL
BUN SERPL-MCNC: 11 MG/DL
CALCIUM SERPL-MCNC: 9.6 MG/DL
CHLORIDE SERPL-SCNC: 103 MMOL/L
CHOLEST SERPL-MCNC: 202 MG/DL
CO2 SERPL-SCNC: 23 MMOL/L
CREAT SERPL-MCNC: 0.86 MG/DL
CREAT SPEC-SCNC: 44 MG/DL
EGFR: 80 ML/MIN/1.73M2
ESTIMATED AVERAGE GLUCOSE: 137 MG/DL
GLUCOSE SERPL-MCNC: 106 MG/DL
HBA1C MFR BLD HPLC: 6.4 %
HDLC SERPL-MCNC: 51 MG/DL
LDLC SERPL CALC-MCNC: 131 MG/DL
MICROALBUMIN 24H UR DL<=1MG/L-MCNC: <1.2 MG/DL
MICROALBUMIN/CREAT 24H UR-RTO: NORMAL MG/G
NONHDLC SERPL-MCNC: 151 MG/DL
POTASSIUM SERPL-SCNC: 4.5 MMOL/L
PROT SERPL-MCNC: 7.8 G/DL
SODIUM SERPL-SCNC: 141 MMOL/L
T4 FREE SERPL-MCNC: 1.8 NG/DL
THYROGLOB AB SERPL IA-ACNC: <1.8 IU/ML
TRIGL SERPL-MCNC: 110 MG/DL
TSH SERPL-ACNC: 0.01 UIU/ML

## 2024-06-18 RX ORDER — ATORVASTATIN CALCIUM 20 MG/1
20 TABLET, FILM COATED ORAL
Qty: 90 | Refills: 3 | Status: ACTIVE | COMMUNITY
Start: 2024-06-18 | End: 1900-01-01

## 2024-07-01 ENCOUNTER — NON-APPOINTMENT (OUTPATIENT)
Age: 54
End: 2024-07-01

## 2024-07-01 RX ORDER — TIRZEPATIDE 5 MG/.5ML
5 INJECTION, SOLUTION SUBCUTANEOUS
Qty: 1 | Refills: 0 | Status: ACTIVE | COMMUNITY
Start: 2024-07-01

## 2024-07-11 ENCOUNTER — APPOINTMENT (OUTPATIENT)
Dept: ENDOCRINOLOGY | Facility: CLINIC | Age: 54
End: 2024-07-11

## 2024-07-31 ENCOUNTER — NON-APPOINTMENT (OUTPATIENT)
Age: 54
End: 2024-07-31

## 2024-07-31 RX ORDER — TIRZEPATIDE 7.5 MG/.5ML
7.5 INJECTION, SOLUTION SUBCUTANEOUS
Qty: 1 | Refills: 1 | Status: ACTIVE | COMMUNITY
Start: 2024-07-31

## 2024-09-22 ENCOUNTER — NON-APPOINTMENT (OUTPATIENT)
Age: 54
End: 2024-09-22

## 2024-09-30 ENCOUNTER — APPOINTMENT (OUTPATIENT)
Dept: ENDOCRINOLOGY | Facility: CLINIC | Age: 54
End: 2024-09-30
Payer: COMMERCIAL

## 2024-09-30 DIAGNOSIS — E11.9 TYPE 2 DIABETES MELLITUS W/OUT COMPLICATIONS: ICD-10-CM

## 2024-09-30 DIAGNOSIS — E89.0 POSTPROCEDURAL HYPOTHYROIDISM: ICD-10-CM

## 2024-09-30 DIAGNOSIS — E66.9 OBESITY, UNSPECIFIED: ICD-10-CM

## 2024-09-30 DIAGNOSIS — E05.00 THYROTOXICOSIS WITH DIFFUSE GOITER W/OUT THYROTOXIC CRISIS OR STORM: ICD-10-CM

## 2024-09-30 PROCEDURE — 99214 OFFICE O/P EST MOD 30 MIN: CPT

## 2024-09-30 PROCEDURE — G2211 COMPLEX E/M VISIT ADD ON: CPT | Mod: NC

## 2024-10-01 ENCOUNTER — APPOINTMENT (OUTPATIENT)
Dept: ULTRASOUND IMAGING | Facility: CLINIC | Age: 54
End: 2024-10-01
Payer: COMMERCIAL

## 2024-10-01 DIAGNOSIS — C73 MALIGNANT NEOPLASM OF THYROID GLAND: ICD-10-CM

## 2024-10-01 LAB
ESTIMATED AVERAGE GLUCOSE: 131 MG/DL
HBA1C MFR BLD HPLC: 6.2 %

## 2024-10-01 PROCEDURE — 76536 US EXAM OF HEAD AND NECK: CPT

## 2024-10-02 LAB
25(OH)D3 SERPL-MCNC: 21.5 NG/ML
T4 FREE SERPL-MCNC: 1.6 NG/DL
THYROGLOB AB SERPL-ACNC: 23.8 IU/ML
THYROGLOB SERPL-MCNC: 2.8 NG/ML
TSH SERPL-ACNC: 0.16 UIU/ML

## 2024-10-21 ENCOUNTER — APPOINTMENT (OUTPATIENT)
Dept: PULMONOLOGY | Facility: CLINIC | Age: 54
End: 2024-10-21
Payer: COMMERCIAL

## 2024-10-21 ENCOUNTER — OUTPATIENT (OUTPATIENT)
Dept: OUTPATIENT SERVICES | Facility: HOSPITAL | Age: 54
LOS: 1 days | End: 2024-10-21

## 2024-10-21 ENCOUNTER — APPOINTMENT (OUTPATIENT)
Dept: ENDOCRINOLOGY | Facility: CLINIC | Age: 54
End: 2024-10-21

## 2024-10-21 ENCOUNTER — NON-APPOINTMENT (OUTPATIENT)
Age: 54
End: 2024-10-21

## 2024-10-21 VITALS
SYSTOLIC BLOOD PRESSURE: 120 MMHG | RESPIRATION RATE: 16 BRPM | OXYGEN SATURATION: 97 % | BODY MASS INDEX: 30.12 KG/M2 | HEART RATE: 64 BPM | HEIGHT: 63 IN | WEIGHT: 170 LBS | DIASTOLIC BLOOD PRESSURE: 70 MMHG

## 2024-10-21 DIAGNOSIS — J45.909 UNSPECIFIED ASTHMA, UNCOMPLICATED: ICD-10-CM

## 2024-10-21 DIAGNOSIS — E66.3 OVERWEIGHT: ICD-10-CM

## 2024-10-21 DIAGNOSIS — R06.02 SHORTNESS OF BREATH: ICD-10-CM

## 2024-10-21 DIAGNOSIS — J30.9 ALLERGIC RHINITIS, UNSPECIFIED: ICD-10-CM

## 2024-10-21 DIAGNOSIS — R93.89 ABNORMAL FINDINGS ON DIAGNOSTIC IMAGING OF OTHER SPECIFIED BODY STRUCTURES: ICD-10-CM

## 2024-10-21 PROCEDURE — 99214 OFFICE O/P EST MOD 30 MIN: CPT | Mod: 25

## 2024-10-21 PROCEDURE — 95012 NITRIC OXIDE EXP GAS DETER: CPT

## 2024-10-21 PROCEDURE — 94010 BREATHING CAPACITY TEST: CPT

## 2024-10-21 RX ORDER — CEPHALEXIN 500 MG/1
500 CAPSULE ORAL
Qty: 14 | Refills: 0 | Status: ACTIVE | COMMUNITY
Start: 2024-10-21 | End: 1900-01-01

## 2024-10-22 RX ORDER — TIRZEPATIDE 12.5 MG/.5ML
12.5 INJECTION, SOLUTION SUBCUTANEOUS
Qty: 1 | Refills: 1 | Status: ACTIVE | COMMUNITY
Start: 2024-10-21

## 2024-11-19 ENCOUNTER — APPOINTMENT (OUTPATIENT)
Dept: ENDOCRINOLOGY | Facility: CLINIC | Age: 54
End: 2024-11-19

## 2024-11-19 ENCOUNTER — NON-APPOINTMENT (OUTPATIENT)
Age: 54
End: 2024-11-19

## 2024-11-19 ENCOUNTER — OUTPATIENT (OUTPATIENT)
Dept: OUTPATIENT SERVICES | Facility: HOSPITAL | Age: 54
LOS: 1 days | End: 2024-11-19

## 2024-11-19 DIAGNOSIS — Z98.51 TUBAL LIGATION STATUS: Chronic | ICD-10-CM

## 2024-11-19 DIAGNOSIS — Z98.891 HISTORY OF UTERINE SCAR FROM PREVIOUS SURGERY: Chronic | ICD-10-CM

## 2024-11-19 DIAGNOSIS — Z98.890 OTHER SPECIFIED POSTPROCEDURAL STATES: Chronic | ICD-10-CM

## 2024-11-19 DIAGNOSIS — O03.9 COMPLETE OR UNSPECIFIED SPONTANEOUS ABORTION WITHOUT COMPLICATION: Chronic | ICD-10-CM

## 2024-11-20 RX ORDER — TIRZEPATIDE 15 MG/.5ML
15 INJECTION, SOLUTION SUBCUTANEOUS
Qty: 1 | Refills: 4 | Status: ACTIVE | COMMUNITY
Start: 2024-11-19

## 2025-03-06 ENCOUNTER — APPOINTMENT (OUTPATIENT)
Dept: PULMONOLOGY | Facility: CLINIC | Age: 55
End: 2025-03-06

## 2025-03-18 ENCOUNTER — APPOINTMENT (OUTPATIENT)
Dept: ENDOCRINOLOGY | Facility: CLINIC | Age: 55
End: 2025-03-18
Payer: COMMERCIAL

## 2025-03-18 VITALS
DIASTOLIC BLOOD PRESSURE: 64 MMHG | HEART RATE: 74 BPM | OXYGEN SATURATION: 98 % | SYSTOLIC BLOOD PRESSURE: 122 MMHG | BODY MASS INDEX: 25.69 KG/M2 | WEIGHT: 145 LBS

## 2025-03-18 DIAGNOSIS — E89.0 POSTPROCEDURAL HYPOTHYROIDISM: ICD-10-CM

## 2025-03-18 DIAGNOSIS — E11.9 TYPE 2 DIABETES MELLITUS W/OUT COMPLICATIONS: ICD-10-CM

## 2025-03-18 DIAGNOSIS — E05.00 THYROTOXICOSIS WITH DIFFUSE GOITER W/OUT THYROTOXIC CRISIS OR STORM: ICD-10-CM

## 2025-03-18 DIAGNOSIS — E66.9 OBESITY, UNSPECIFIED: ICD-10-CM

## 2025-03-18 DIAGNOSIS — C73 MALIGNANT NEOPLASM OF THYROID GLAND: ICD-10-CM

## 2025-03-18 LAB
CHOLEST SERPL-MCNC: 188 MG/DL
CREAT SPEC-SCNC: 138 MG/DL
HBA1C MFR BLD HPLC: 5.4
HDLC SERPL-MCNC: 52 MG/DL
LDLC SERPL-MCNC: 114 MG/DL
MICROALBUMIN 24H UR DL<=1MG/L-MCNC: <1.2 MG/DL
MICROALBUMIN/CREAT 24H UR-RTO: NORMAL MG/G
NONHDLC SERPL-MCNC: 136 MG/DL
T4 FREE SERPL-MCNC: 1.6 NG/DL
THYROGLOB AB SERPL-ACNC: 20 IU/ML
THYROGLOB SERPL-MCNC: 3.18 NG/ML
TRIGL SERPL-MCNC: 121 MG/DL
TSH SERPL-ACNC: 1.21 UIU/ML

## 2025-03-18 PROCEDURE — G2211 COMPLEX E/M VISIT ADD ON: CPT | Mod: NC

## 2025-03-18 PROCEDURE — 99214 OFFICE O/P EST MOD 30 MIN: CPT

## 2025-03-21 ENCOUNTER — APPOINTMENT (OUTPATIENT)
Dept: ULTRASOUND IMAGING | Facility: CLINIC | Age: 55
End: 2025-03-21
Payer: COMMERCIAL

## 2025-03-21 PROCEDURE — 76536 US EXAM OF HEAD AND NECK: CPT

## 2025-03-31 DIAGNOSIS — C73 MALIGNANT NEOPLASM OF THYROID GLAND: ICD-10-CM

## 2025-04-09 ENCOUNTER — RX RENEWAL (OUTPATIENT)
Age: 55
End: 2025-04-09

## 2025-04-24 ENCOUNTER — NON-APPOINTMENT (OUTPATIENT)
Age: 55
End: 2025-04-24

## 2025-05-12 ENCOUNTER — NON-APPOINTMENT (OUTPATIENT)
Age: 55
End: 2025-05-12

## 2025-05-14 ENCOUNTER — APPOINTMENT (OUTPATIENT)
Dept: PULMONOLOGY | Facility: CLINIC | Age: 55
End: 2025-05-14
Payer: COMMERCIAL

## 2025-05-14 VITALS
SYSTOLIC BLOOD PRESSURE: 116 MMHG | WEIGHT: 133 LBS | TEMPERATURE: 97.3 F | RESPIRATION RATE: 16 BRPM | BODY MASS INDEX: 23.56 KG/M2 | HEART RATE: 77 BPM | OXYGEN SATURATION: 98 % | DIASTOLIC BLOOD PRESSURE: 72 MMHG

## 2025-05-14 DIAGNOSIS — J30.9 ALLERGIC RHINITIS, UNSPECIFIED: ICD-10-CM

## 2025-05-14 DIAGNOSIS — J45.909 UNSPECIFIED ASTHMA, UNCOMPLICATED: ICD-10-CM

## 2025-05-14 DIAGNOSIS — R06.02 SHORTNESS OF BREATH: ICD-10-CM

## 2025-05-14 DIAGNOSIS — Z12.2 ENCOUNTER FOR SCREENING FOR MALIGNANT NEOPLASM OF RESPIRATORY ORGANS: ICD-10-CM

## 2025-05-14 DIAGNOSIS — R93.89 ABNORMAL FINDINGS ON DIAGNOSTIC IMAGING OF OTHER SPECIFIED BODY STRUCTURES: ICD-10-CM

## 2025-05-14 PROCEDURE — ZZZZZ: CPT

## 2025-05-14 PROCEDURE — 95012 NITRIC OXIDE EXP GAS DETER: CPT

## 2025-05-14 PROCEDURE — 99214 OFFICE O/P EST MOD 30 MIN: CPT | Mod: 25

## 2025-05-14 PROCEDURE — 94727 GAS DIL/WSHOT DETER LNG VOL: CPT

## 2025-05-14 PROCEDURE — G0296 VISIT TO DETERM LDCT ELIG: CPT

## 2025-05-14 PROCEDURE — 94729 DIFFUSING CAPACITY: CPT

## 2025-05-14 PROCEDURE — 94010 BREATHING CAPACITY TEST: CPT

## 2025-05-21 NOTE — ED PROVIDER NOTE - NEUROLOGICAL SENSATION
PA for Tirzepatide-Weight Management 2.5 MG/0.5ML Solution Auto-injector  is in process   present and normal in 4 extremities

## 2025-05-22 ENCOUNTER — RX RENEWAL (OUTPATIENT)
Age: 55
End: 2025-05-22

## 2025-05-22 ENCOUNTER — NON-APPOINTMENT (OUTPATIENT)
Age: 55
End: 2025-05-22

## 2025-06-04 ENCOUNTER — NON-APPOINTMENT (OUTPATIENT)
Age: 55
End: 2025-06-04

## 2025-06-04 VITALS — HEIGHT: 63 IN | BODY MASS INDEX: 23.57 KG/M2 | WEIGHT: 133 LBS

## 2025-06-04 DIAGNOSIS — Z80.1 FAMILY HISTORY OF MALIGNANT NEOPLASM OF TRACHEA, BRONCHUS AND LUNG: ICD-10-CM

## 2025-06-04 DIAGNOSIS — F17.200 NICOTINE DEPENDENCE, UNSPECIFIED, UNCOMPLICATED: ICD-10-CM

## 2025-06-05 ENCOUNTER — OUTPATIENT (OUTPATIENT)
Dept: OUTPATIENT SERVICES | Facility: HOSPITAL | Age: 55
LOS: 1 days | End: 2025-06-05
Payer: COMMERCIAL

## 2025-06-05 ENCOUNTER — APPOINTMENT (OUTPATIENT)
Dept: CT IMAGING | Facility: CLINIC | Age: 55
End: 2025-06-05

## 2025-06-05 DIAGNOSIS — Z98.890 OTHER SPECIFIED POSTPROCEDURAL STATES: Chronic | ICD-10-CM

## 2025-06-05 DIAGNOSIS — O03.9 COMPLETE OR UNSPECIFIED SPONTANEOUS ABORTION WITHOUT COMPLICATION: Chronic | ICD-10-CM

## 2025-06-05 DIAGNOSIS — Z12.2 ENCOUNTER FOR SCREENING FOR MALIGNANT NEOPLASM OF RESPIRATORY ORGANS: ICD-10-CM

## 2025-06-05 DIAGNOSIS — Z00.8 ENCOUNTER FOR OTHER GENERAL EXAMINATION: ICD-10-CM

## 2025-06-05 DIAGNOSIS — Z98.51 TUBAL LIGATION STATUS: Chronic | ICD-10-CM

## 2025-06-05 DIAGNOSIS — Z98.891 HISTORY OF UTERINE SCAR FROM PREVIOUS SURGERY: Chronic | ICD-10-CM

## 2025-06-05 PROCEDURE — 71271 CT THORAX LUNG CANCER SCR C-: CPT

## 2025-06-05 PROCEDURE — 71271 CT THORAX LUNG CANCER SCR C-: CPT | Mod: 26

## 2025-06-19 ENCOUNTER — RX RENEWAL (OUTPATIENT)
Age: 55
End: 2025-06-19

## 2025-08-26 ENCOUNTER — INPATIENT (INPATIENT)
Facility: HOSPITAL | Age: 55
LOS: 3 days | Discharge: LEFT BEFORE DISCHARGE | DRG: 392 | End: 2025-08-30
Attending: HOSPITALIST | Admitting: STUDENT IN AN ORGANIZED HEALTH CARE EDUCATION/TRAINING PROGRAM
Payer: COMMERCIAL

## 2025-08-26 ENCOUNTER — RX RENEWAL (OUTPATIENT)
Age: 55
End: 2025-08-26

## 2025-08-26 VITALS
SYSTOLIC BLOOD PRESSURE: 154 MMHG | OXYGEN SATURATION: 100 % | HEART RATE: 77 BPM | RESPIRATION RATE: 16 BRPM | HEIGHT: 66 IN | DIASTOLIC BLOOD PRESSURE: 98 MMHG | WEIGHT: 149.91 LBS | TEMPERATURE: 98 F

## 2025-08-26 DIAGNOSIS — O03.9 COMPLETE OR UNSPECIFIED SPONTANEOUS ABORTION WITHOUT COMPLICATION: Chronic | ICD-10-CM

## 2025-08-26 DIAGNOSIS — Z98.890 OTHER SPECIFIED POSTPROCEDURAL STATES: Chronic | ICD-10-CM

## 2025-08-26 DIAGNOSIS — Z98.891 HISTORY OF UTERINE SCAR FROM PREVIOUS SURGERY: Chronic | ICD-10-CM

## 2025-08-26 DIAGNOSIS — Z98.51 TUBAL LIGATION STATUS: Chronic | ICD-10-CM

## 2025-08-26 LAB
ALBUMIN SERPL ELPH-MCNC: 3.2 G/DL — LOW (ref 3.3–5)
ALP SERPL-CCNC: 112 U/L — SIGNIFICANT CHANGE UP (ref 40–120)
ALT FLD-CCNC: 13 U/L — SIGNIFICANT CHANGE UP (ref 10–45)
ANION GAP SERPL CALC-SCNC: 15 MMOL/L — SIGNIFICANT CHANGE UP (ref 5–17)
APPEARANCE UR: CLEAR — SIGNIFICANT CHANGE UP
AST SERPL-CCNC: 12 U/L — SIGNIFICANT CHANGE UP (ref 10–40)
BACTERIA # UR AUTO: NEGATIVE /HPF — SIGNIFICANT CHANGE UP
BASOPHILS # BLD AUTO: 0.02 K/UL — SIGNIFICANT CHANGE UP (ref 0–0.2)
BASOPHILS NFR BLD AUTO: 0.2 % — SIGNIFICANT CHANGE UP (ref 0–2)
BILIRUB SERPL-MCNC: 0.4 MG/DL — SIGNIFICANT CHANGE UP (ref 0.2–1.2)
BILIRUB UR-MCNC: NEGATIVE — SIGNIFICANT CHANGE UP
BUN SERPL-MCNC: 6 MG/DL — LOW (ref 7–23)
CALCIUM SERPL-MCNC: 8.7 MG/DL — SIGNIFICANT CHANGE UP (ref 8.4–10.5)
CAST: 4 /LPF — SIGNIFICANT CHANGE UP (ref 0–4)
CHLORIDE SERPL-SCNC: 98 MMOL/L — SIGNIFICANT CHANGE UP (ref 96–108)
CO2 SERPL-SCNC: 25 MMOL/L — SIGNIFICANT CHANGE UP (ref 22–31)
COLOR SPEC: YELLOW — SIGNIFICANT CHANGE UP
CREAT SERPL-MCNC: 0.64 MG/DL — SIGNIFICANT CHANGE UP (ref 0.5–1.3)
DIFF PNL FLD: ABNORMAL
EGFR: 104 ML/MIN/1.73M2 — SIGNIFICANT CHANGE UP
EGFR: 104 ML/MIN/1.73M2 — SIGNIFICANT CHANGE UP
EOSINOPHIL # BLD AUTO: 0.07 K/UL — SIGNIFICANT CHANGE UP (ref 0–0.5)
EOSINOPHIL NFR BLD AUTO: 0.6 % — SIGNIFICANT CHANGE UP (ref 0–6)
GAS PNL BLDV: SIGNIFICANT CHANGE UP
GLUCOSE SERPL-MCNC: 105 MG/DL — HIGH (ref 70–99)
GLUCOSE UR QL: NEGATIVE MG/DL — SIGNIFICANT CHANGE UP
HCT VFR BLD CALC: 34.8 % — SIGNIFICANT CHANGE UP (ref 34.5–45)
HGB BLD-MCNC: 11.3 G/DL — LOW (ref 11.5–15.5)
IMM GRANULOCYTES # BLD AUTO: 0.05 K/UL — SIGNIFICANT CHANGE UP (ref 0–0.07)
IMM GRANULOCYTES NFR BLD AUTO: 0.5 % — SIGNIFICANT CHANGE UP (ref 0–0.9)
KETONES UR QL: 80 MG/DL
LEUKOCYTE ESTERASE UR-ACNC: ABNORMAL
LIDOCAIN IGE QN: 32 U/L — SIGNIFICANT CHANGE UP (ref 7–60)
LYMPHOCYTES # BLD AUTO: 0.99 K/UL — LOW (ref 1–3.3)
LYMPHOCYTES NFR BLD AUTO: 9 % — LOW (ref 13–44)
MCHC RBC-ENTMCNC: 28 PG — SIGNIFICANT CHANGE UP (ref 27–34)
MCHC RBC-ENTMCNC: 32.5 G/DL — SIGNIFICANT CHANGE UP (ref 32–36)
MCV RBC AUTO: 86.1 FL — SIGNIFICANT CHANGE UP (ref 80–100)
MONOCYTES # BLD AUTO: 0.68 K/UL — SIGNIFICANT CHANGE UP (ref 0–0.9)
MONOCYTES NFR BLD AUTO: 6.2 % — SIGNIFICANT CHANGE UP (ref 2–14)
NEUTROPHILS # BLD AUTO: 9.13 K/UL — HIGH (ref 1.8–7.4)
NEUTROPHILS NFR BLD AUTO: 83.5 % — HIGH (ref 43–77)
NITRITE UR-MCNC: NEGATIVE — SIGNIFICANT CHANGE UP
NRBC # BLD AUTO: 0 K/UL — SIGNIFICANT CHANGE UP (ref 0–0)
NRBC # FLD: 0 K/UL — SIGNIFICANT CHANGE UP (ref 0–0)
NRBC BLD AUTO-RTO: 0 /100 WBCS — SIGNIFICANT CHANGE UP (ref 0–0)
PH UR: >=9 (ref 5–8)
PLATELET # BLD AUTO: 395 K/UL — SIGNIFICANT CHANGE UP (ref 150–400)
PMV BLD: 9.7 FL — SIGNIFICANT CHANGE UP (ref 7–13)
POTASSIUM SERPL-MCNC: 3.3 MMOL/L — LOW (ref 3.5–5.3)
POTASSIUM SERPL-SCNC: 3.3 MMOL/L — LOW (ref 3.5–5.3)
PROT SERPL-MCNC: 6.5 G/DL — SIGNIFICANT CHANGE UP (ref 6–8.3)
PROT UR-MCNC: 100 MG/DL
RBC # BLD: 4.04 M/UL — SIGNIFICANT CHANGE UP (ref 3.8–5.2)
RBC # FLD: 12.5 % — SIGNIFICANT CHANGE UP (ref 10.3–14.5)
RBC CASTS # UR COMP ASSIST: 20 /HPF — HIGH (ref 0–4)
REVIEW: SIGNIFICANT CHANGE UP
SODIUM SERPL-SCNC: 138 MMOL/L — SIGNIFICANT CHANGE UP (ref 135–145)
SP GR SPEC: 1.02 — SIGNIFICANT CHANGE UP (ref 1–1.03)
SQUAMOUS # UR AUTO: 16 /HPF — HIGH (ref 0–5)
UROBILINOGEN FLD QL: 1 MG/DL — SIGNIFICANT CHANGE UP (ref 0.2–1)
WBC # BLD: 10.94 K/UL — HIGH (ref 3.8–10.5)
WBC # FLD AUTO: 10.94 K/UL — HIGH (ref 3.8–10.5)
WBC UR QL: 5 /HPF — SIGNIFICANT CHANGE UP (ref 0–5)

## 2025-08-26 PROCEDURE — 74177 CT ABD & PELVIS W/CONTRAST: CPT | Mod: 26

## 2025-08-26 PROCEDURE — 99285 EMERGENCY DEPT VISIT HI MDM: CPT

## 2025-08-26 RX ORDER — ONDANSETRON HCL/PF 4 MG/2 ML
4 VIAL (ML) INJECTION ONCE
Refills: 0 | Status: COMPLETED | OUTPATIENT
Start: 2025-08-26 | End: 2025-08-26

## 2025-08-26 RX ORDER — SODIUM CHLORIDE 9 G/1000ML
1000 INJECTION, SOLUTION INTRAVENOUS ONCE
Refills: 0 | Status: COMPLETED | OUTPATIENT
Start: 2025-08-26 | End: 2025-08-26

## 2025-08-26 RX ADMIN — SODIUM CHLORIDE 1000 MILLILITER(S): 9 INJECTION, SOLUTION INTRAVENOUS at 19:19

## 2025-08-26 RX ADMIN — Medication 4 MILLIGRAM(S): at 20:18

## 2025-08-27 DIAGNOSIS — K52.9 NONINFECTIVE GASTROENTERITIS AND COLITIS, UNSPECIFIED: ICD-10-CM

## 2025-08-27 DIAGNOSIS — E11.9 TYPE 2 DIABETES MELLITUS WITHOUT COMPLICATIONS: ICD-10-CM

## 2025-08-27 DIAGNOSIS — N30.90 CYSTITIS, UNSPECIFIED WITHOUT HEMATURIA: ICD-10-CM

## 2025-08-27 DIAGNOSIS — R11.0 NAUSEA: ICD-10-CM

## 2025-08-27 DIAGNOSIS — E05.00 THYROTOXICOSIS WITH DIFFUSE GOITER WITHOUT THYROTOXIC CRISIS OR STORM: ICD-10-CM

## 2025-08-27 DIAGNOSIS — Z29.9 ENCOUNTER FOR PROPHYLACTIC MEASURES, UNSPECIFIED: ICD-10-CM

## 2025-08-27 DIAGNOSIS — E87.6 HYPOKALEMIA: ICD-10-CM

## 2025-08-27 DIAGNOSIS — Z98.890 OTHER SPECIFIED POSTPROCEDURAL STATES: ICD-10-CM

## 2025-08-27 DIAGNOSIS — J45.909 UNSPECIFIED ASTHMA, UNCOMPLICATED: ICD-10-CM

## 2025-08-27 LAB
CULTURE RESULTS: NO GROWTH — SIGNIFICANT CHANGE UP
GLUCOSE BLDC GLUCOMTR-MCNC: 79 MG/DL — SIGNIFICANT CHANGE UP (ref 70–99)
GLUCOSE BLDC GLUCOMTR-MCNC: 91 MG/DL — SIGNIFICANT CHANGE UP (ref 70–99)
SPECIMEN SOURCE: SIGNIFICANT CHANGE UP

## 2025-08-27 PROCEDURE — 85018 HEMOGLOBIN: CPT

## 2025-08-27 PROCEDURE — 82947 ASSAY GLUCOSE BLOOD QUANT: CPT

## 2025-08-27 PROCEDURE — 85014 HEMATOCRIT: CPT

## 2025-08-27 PROCEDURE — 76705 ECHO EXAM OF ABDOMEN: CPT

## 2025-08-27 PROCEDURE — 83605 ASSAY OF LACTIC ACID: CPT

## 2025-08-27 PROCEDURE — 84132 ASSAY OF SERUM POTASSIUM: CPT

## 2025-08-27 PROCEDURE — 93975 VASCULAR STUDY: CPT | Mod: 26

## 2025-08-27 PROCEDURE — 82962 GLUCOSE BLOOD TEST: CPT

## 2025-08-27 PROCEDURE — 85025 COMPLETE CBC W/AUTO DIFF WBC: CPT

## 2025-08-27 PROCEDURE — 93975 VASCULAR STUDY: CPT

## 2025-08-27 PROCEDURE — 80053 COMPREHEN METABOLIC PANEL: CPT

## 2025-08-27 PROCEDURE — 82435 ASSAY OF BLOOD CHLORIDE: CPT

## 2025-08-27 PROCEDURE — 82330 ASSAY OF CALCIUM: CPT

## 2025-08-27 PROCEDURE — 76705 ECHO EXAM OF ABDOMEN: CPT | Mod: 26,59

## 2025-08-27 PROCEDURE — 99223 1ST HOSP IP/OBS HIGH 75: CPT

## 2025-08-27 PROCEDURE — 99222 1ST HOSP IP/OBS MODERATE 55: CPT

## 2025-08-27 PROCEDURE — 76856 US EXAM PELVIC COMPLETE: CPT

## 2025-08-27 PROCEDURE — 81001 URINALYSIS AUTO W/SCOPE: CPT

## 2025-08-27 PROCEDURE — 76856 US EXAM PELVIC COMPLETE: CPT | Mod: 26,59

## 2025-08-27 PROCEDURE — 76830 TRANSVAGINAL US NON-OB: CPT | Mod: 26

## 2025-08-27 PROCEDURE — 83690 ASSAY OF LIPASE: CPT

## 2025-08-27 PROCEDURE — 82803 BLOOD GASES ANY COMBINATION: CPT

## 2025-08-27 PROCEDURE — 87086 URINE CULTURE/COLONY COUNT: CPT

## 2025-08-27 PROCEDURE — 74177 CT ABD & PELVIS W/CONTRAST: CPT

## 2025-08-27 PROCEDURE — 84295 ASSAY OF SERUM SODIUM: CPT

## 2025-08-27 PROCEDURE — 76830 TRANSVAGINAL US NON-OB: CPT

## 2025-08-27 RX ORDER — DEXTROSE 50 % IN WATER 50 %
15 SYRINGE (ML) INTRAVENOUS ONCE
Refills: 0 | Status: DISCONTINUED | OUTPATIENT
Start: 2025-08-27 | End: 2025-08-30

## 2025-08-27 RX ORDER — PIPERACILLIN-TAZO-DEXTROSE,ISO 3.375G/5
3.38 IV SOLUTION, PIGGYBACK PREMIX FROZEN(ML) INTRAVENOUS ONCE
Refills: 0 | Status: COMPLETED | OUTPATIENT
Start: 2025-08-27 | End: 2025-08-27

## 2025-08-27 RX ORDER — ACETAMINOPHEN 500 MG/5ML
1000 LIQUID (ML) ORAL ONCE
Refills: 0 | Status: COMPLETED | OUTPATIENT
Start: 2025-08-27 | End: 2025-08-27

## 2025-08-27 RX ORDER — DEXTROSE 50 % IN WATER 50 %
12.5 SYRINGE (ML) INTRAVENOUS ONCE
Refills: 0 | Status: DISCONTINUED | OUTPATIENT
Start: 2025-08-27 | End: 2025-08-30

## 2025-08-27 RX ORDER — LEVOTHYROXINE SODIUM 300 MCG
1 TABLET ORAL
Refills: 0 | DISCHARGE

## 2025-08-27 RX ORDER — PIPERACILLIN-TAZO-DEXTROSE,ISO 3.375G/5
3.38 IV SOLUTION, PIGGYBACK PREMIX FROZEN(ML) INTRAVENOUS ONCE
Refills: 0 | Status: DISCONTINUED | OUTPATIENT
Start: 2025-08-27 | End: 2025-08-27

## 2025-08-27 RX ORDER — PIPERACILLIN-TAZO-DEXTROSE,ISO 3.375G/5
3.38 IV SOLUTION, PIGGYBACK PREMIX FROZEN(ML) INTRAVENOUS EVERY 8 HOURS
Refills: 0 | Status: DISCONTINUED | OUTPATIENT
Start: 2025-08-28 | End: 2025-08-29

## 2025-08-27 RX ORDER — LEVOTHYROXINE SODIUM 300 MCG
112 TABLET ORAL DAILY
Refills: 0 | Status: DISCONTINUED | OUTPATIENT
Start: 2025-08-30 | End: 2025-08-30

## 2025-08-27 RX ORDER — SODIUM CHLORIDE 9 G/1000ML
1000 INJECTION, SOLUTION INTRAVENOUS
Refills: 0 | Status: DISCONTINUED | OUTPATIENT
Start: 2025-08-27 | End: 2025-08-27

## 2025-08-27 RX ORDER — METRONIDAZOLE 250 MG
500 TABLET ORAL ONCE
Refills: 0 | Status: DISCONTINUED | OUTPATIENT
Start: 2025-08-27 | End: 2025-08-27

## 2025-08-27 RX ORDER — METOCLOPRAMIDE HCL 10 MG
10 TABLET ORAL EVERY 8 HOURS
Refills: 0 | Status: COMPLETED | OUTPATIENT
Start: 2025-08-27 | End: 2025-08-29

## 2025-08-27 RX ORDER — DEXTROSE 50 % IN WATER 50 %
25 SYRINGE (ML) INTRAVENOUS ONCE
Refills: 0 | Status: DISCONTINUED | OUTPATIENT
Start: 2025-08-27 | End: 2025-08-30

## 2025-08-27 RX ORDER — SODIUM CHLORIDE 9 G/1000ML
1000 INJECTION, SOLUTION INTRAVENOUS
Refills: 0 | Status: DISCONTINUED | OUTPATIENT
Start: 2025-08-27 | End: 2025-08-30

## 2025-08-27 RX ORDER — MONTELUKAST SODIUM 10 MG/1
10 TABLET ORAL DAILY
Refills: 0 | Status: DISCONTINUED | OUTPATIENT
Start: 2025-08-27 | End: 2025-08-30

## 2025-08-27 RX ORDER — PIPERACILLIN-TAZO-DEXTROSE,ISO 3.375G/5
3.38 IV SOLUTION, PIGGYBACK PREMIX FROZEN(ML) INTRAVENOUS EVERY 8 HOURS
Refills: 0 | Status: DISCONTINUED | OUTPATIENT
Start: 2025-08-27 | End: 2025-08-27

## 2025-08-27 RX ORDER — INSULIN LISPRO 100 U/ML
INJECTION, SOLUTION INTRAVENOUS; SUBCUTANEOUS
Refills: 0 | Status: DISCONTINUED | OUTPATIENT
Start: 2025-08-27 | End: 2025-08-30

## 2025-08-27 RX ORDER — METOCLOPRAMIDE HCL 10 MG
10 TABLET ORAL EVERY 8 HOURS
Refills: 0 | Status: DISCONTINUED | OUTPATIENT
Start: 2025-08-27 | End: 2025-08-27

## 2025-08-27 RX ORDER — INSULIN LISPRO 100 U/ML
INJECTION, SOLUTION INTRAVENOUS; SUBCUTANEOUS AT BEDTIME
Refills: 0 | Status: DISCONTINUED | OUTPATIENT
Start: 2025-08-27 | End: 2025-08-30

## 2025-08-27 RX ORDER — METRONIDAZOLE 250 MG
TABLET ORAL
Refills: 0 | Status: DISCONTINUED | OUTPATIENT
Start: 2025-08-27 | End: 2025-08-27

## 2025-08-27 RX ORDER — LEVOTHYROXINE SODIUM 300 MCG
56 TABLET ORAL AT BEDTIME
Refills: 0 | Status: DISCONTINUED | OUTPATIENT
Start: 2025-08-27 | End: 2025-08-27

## 2025-08-27 RX ORDER — ONDANSETRON HCL/PF 4 MG/2 ML
4 VIAL (ML) INJECTION ONCE
Refills: 0 | Status: COMPLETED | OUTPATIENT
Start: 2025-08-27 | End: 2025-08-27

## 2025-08-27 RX ORDER — CIPROFLOXACIN HCL 250 MG
TABLET ORAL
Refills: 0 | Status: DISCONTINUED | OUTPATIENT
Start: 2025-08-27 | End: 2025-08-27

## 2025-08-27 RX ORDER — CIPROFLOXACIN HCL 250 MG
400 TABLET ORAL ONCE
Refills: 0 | Status: DISCONTINUED | OUTPATIENT
Start: 2025-08-27 | End: 2025-08-27

## 2025-08-27 RX ORDER — METRONIDAZOLE 250 MG
500 TABLET ORAL EVERY 8 HOURS
Refills: 0 | Status: DISCONTINUED | OUTPATIENT
Start: 2025-08-27 | End: 2025-08-27

## 2025-08-27 RX ORDER — CEFEPIME 2 G/20ML
1000 INJECTION, POWDER, FOR SOLUTION INTRAVENOUS ONCE
Refills: 0 | Status: COMPLETED | OUTPATIENT
Start: 2025-08-27 | End: 2025-08-27

## 2025-08-27 RX ORDER — ACETAMINOPHEN 500 MG/5ML
650 LIQUID (ML) ORAL EVERY 6 HOURS
Refills: 0 | Status: DISCONTINUED | OUTPATIENT
Start: 2025-08-27 | End: 2025-08-30

## 2025-08-27 RX ORDER — ONDANSETRON HCL/PF 4 MG/2 ML
4 VIAL (ML) INJECTION EVERY 8 HOURS
Refills: 0 | Status: DISCONTINUED | OUTPATIENT
Start: 2025-08-27 | End: 2025-08-29

## 2025-08-27 RX ORDER — METOCLOPRAMIDE HCL 10 MG
10 TABLET ORAL ONCE
Refills: 0 | Status: COMPLETED | OUTPATIENT
Start: 2025-08-27 | End: 2025-08-27

## 2025-08-27 RX ORDER — SODIUM CHLORIDE 9 G/1000ML
1000 INJECTION, SOLUTION INTRAVENOUS
Refills: 0 | Status: DISCONTINUED | OUTPATIENT
Start: 2025-08-27 | End: 2025-08-29

## 2025-08-27 RX ORDER — KETOROLAC TROMETHAMINE 30 MG/ML
15 INJECTION, SOLUTION INTRAMUSCULAR; INTRAVENOUS ONCE
Refills: 0 | Status: DISCONTINUED | OUTPATIENT
Start: 2025-08-27 | End: 2025-08-27

## 2025-08-27 RX ORDER — GLUCAGON 3 MG/1
1 POWDER NASAL ONCE
Refills: 0 | Status: DISCONTINUED | OUTPATIENT
Start: 2025-08-27 | End: 2025-08-30

## 2025-08-27 RX ORDER — MONTELUKAST SODIUM 10 MG/1
1 TABLET ORAL
Refills: 0 | DISCHARGE

## 2025-08-27 RX ORDER — LEVOTHYROXINE SODIUM 300 MCG
90 TABLET ORAL AT BEDTIME
Refills: 0 | Status: COMPLETED | OUTPATIENT
Start: 2025-08-27 | End: 2025-08-29

## 2025-08-27 RX ADMIN — Medication 100 MILLIEQUIVALENT(S): at 16:47

## 2025-08-27 RX ADMIN — SODIUM CHLORIDE 75 MILLILITER(S): 9 INJECTION, SOLUTION INTRAVENOUS at 15:09

## 2025-08-27 RX ADMIN — Medication 100 MILLIEQUIVALENT(S): at 18:23

## 2025-08-27 RX ADMIN — Medication 200 GRAM(S): at 16:47

## 2025-08-27 RX ADMIN — Medication 100 MILLIEQUIVALENT(S): at 15:15

## 2025-08-27 RX ADMIN — Medication 10 MILLIGRAM(S): at 13:47

## 2025-08-27 RX ADMIN — KETOROLAC TROMETHAMINE 15 MILLIGRAM(S): 30 INJECTION, SOLUTION INTRAMUSCULAR; INTRAVENOUS at 05:10

## 2025-08-27 RX ADMIN — Medication 10 MILLIGRAM(S): at 19:37

## 2025-08-27 RX ADMIN — Medication 4 MILLIGRAM(S): at 06:18

## 2025-08-27 RX ADMIN — Medication 400 MILLIGRAM(S): at 15:09

## 2025-08-27 RX ADMIN — Medication 25 GRAM(S): at 18:22

## 2025-08-27 RX ADMIN — Medication 90 MICROGRAM(S): at 21:42

## 2025-08-27 RX ADMIN — Medication 400 MILLIGRAM(S): at 05:10

## 2025-08-27 RX ADMIN — CEFEPIME 100 MILLIGRAM(S): 2 INJECTION, POWDER, FOR SOLUTION INTRAVENOUS at 06:21

## 2025-08-27 RX ADMIN — Medication 10 MILLIGRAM(S): at 15:15

## 2025-08-28 LAB
A1C WITH ESTIMATED AVERAGE GLUCOSE RESULT: 5.6 % — SIGNIFICANT CHANGE UP (ref 4–5.6)
ALBUMIN SERPL ELPH-MCNC: 3.2 G/DL — LOW (ref 3.3–5)
ALP SERPL-CCNC: 111 U/L — SIGNIFICANT CHANGE UP (ref 40–120)
ALT FLD-CCNC: 12 U/L — SIGNIFICANT CHANGE UP (ref 10–45)
ANION GAP SERPL CALC-SCNC: 19 MMOL/L — HIGH (ref 5–17)
AST SERPL-CCNC: 14 U/L — SIGNIFICANT CHANGE UP (ref 10–40)
BASOPHILS # BLD AUTO: 0.05 K/UL — SIGNIFICANT CHANGE UP (ref 0–0.2)
BASOPHILS NFR BLD AUTO: 0.4 % — SIGNIFICANT CHANGE UP (ref 0–2)
BILIRUB SERPL-MCNC: 0.4 MG/DL — SIGNIFICANT CHANGE UP (ref 0.2–1.2)
BUN SERPL-MCNC: 6 MG/DL — LOW (ref 7–23)
CALCIUM SERPL-MCNC: 8.7 MG/DL — SIGNIFICANT CHANGE UP (ref 8.4–10.5)
CHLORIDE SERPL-SCNC: 89 MMOL/L — LOW (ref 96–108)
CO2 SERPL-SCNC: 22 MMOL/L — SIGNIFICANT CHANGE UP (ref 22–31)
CREAT SERPL-MCNC: 0.58 MG/DL — SIGNIFICANT CHANGE UP (ref 0.5–1.3)
EGFR: 107 ML/MIN/1.73M2 — SIGNIFICANT CHANGE UP
EGFR: 107 ML/MIN/1.73M2 — SIGNIFICANT CHANGE UP
EOSINOPHIL # BLD AUTO: 0.08 K/UL — SIGNIFICANT CHANGE UP (ref 0–0.5)
EOSINOPHIL NFR BLD AUTO: 0.6 % — SIGNIFICANT CHANGE UP (ref 0–6)
ESTIMATED AVERAGE GLUCOSE: 114 MG/DL — SIGNIFICANT CHANGE UP (ref 68–114)
GLUCOSE BLDC GLUCOMTR-MCNC: 101 MG/DL — HIGH (ref 70–99)
GLUCOSE BLDC GLUCOMTR-MCNC: 112 MG/DL — HIGH (ref 70–99)
GLUCOSE BLDC GLUCOMTR-MCNC: 84 MG/DL — SIGNIFICANT CHANGE UP (ref 70–99)
GLUCOSE BLDC GLUCOMTR-MCNC: 91 MG/DL — SIGNIFICANT CHANGE UP (ref 70–99)
GLUCOSE SERPL-MCNC: 87 MG/DL — SIGNIFICANT CHANGE UP (ref 70–99)
HCT VFR BLD CALC: 36.1 % — SIGNIFICANT CHANGE UP (ref 34.5–45)
HGB BLD-MCNC: 12.1 G/DL — SIGNIFICANT CHANGE UP (ref 11.5–15.5)
IMM GRANULOCYTES # BLD AUTO: 0.25 K/UL — HIGH (ref 0–0.07)
IMM GRANULOCYTES NFR BLD AUTO: 1.9 % — HIGH (ref 0–0.9)
LYMPHOCYTES # BLD AUTO: 1.65 K/UL — SIGNIFICANT CHANGE UP (ref 1–3.3)
LYMPHOCYTES NFR BLD AUTO: 12.2 % — LOW (ref 13–44)
MAGNESIUM SERPL-MCNC: 1.9 MG/DL — SIGNIFICANT CHANGE UP (ref 1.6–2.6)
MCHC RBC-ENTMCNC: 27.9 PG — SIGNIFICANT CHANGE UP (ref 27–34)
MCHC RBC-ENTMCNC: 33.5 G/DL — SIGNIFICANT CHANGE UP (ref 32–36)
MCV RBC AUTO: 83.2 FL — SIGNIFICANT CHANGE UP (ref 80–100)
MONOCYTES # BLD AUTO: 0.76 K/UL — SIGNIFICANT CHANGE UP (ref 0–0.9)
MONOCYTES NFR BLD AUTO: 5.6 % — SIGNIFICANT CHANGE UP (ref 2–14)
NEUTROPHILS # BLD AUTO: 10.72 K/UL — HIGH (ref 1.8–7.4)
NEUTROPHILS NFR BLD AUTO: 79.3 % — HIGH (ref 43–77)
NRBC # BLD AUTO: 0 K/UL — SIGNIFICANT CHANGE UP (ref 0–0)
NRBC # FLD: 0 K/UL — SIGNIFICANT CHANGE UP (ref 0–0)
NRBC BLD AUTO-RTO: 0 /100 WBCS — SIGNIFICANT CHANGE UP (ref 0–0)
PHOSPHATE SERPL-MCNC: 3.5 MG/DL — SIGNIFICANT CHANGE UP (ref 2.5–4.5)
PLATELET # BLD AUTO: 490 K/UL — HIGH (ref 150–400)
PMV BLD: 9.6 FL — SIGNIFICANT CHANGE UP (ref 7–13)
POTASSIUM SERPL-MCNC: 3.1 MMOL/L — LOW (ref 3.5–5.3)
POTASSIUM SERPL-SCNC: 3.1 MMOL/L — LOW (ref 3.5–5.3)
PROT SERPL-MCNC: 6.6 G/DL — SIGNIFICANT CHANGE UP (ref 6–8.3)
RBC # BLD: 4.34 M/UL — SIGNIFICANT CHANGE UP (ref 3.8–5.2)
RBC # FLD: 12.2 % — SIGNIFICANT CHANGE UP (ref 10.3–14.5)
SODIUM SERPL-SCNC: 130 MMOL/L — LOW (ref 135–145)
WBC # BLD: 13.51 K/UL — HIGH (ref 3.8–10.5)
WBC # FLD AUTO: 13.51 K/UL — HIGH (ref 3.8–10.5)

## 2025-08-28 PROCEDURE — 74177 CT ABD & PELVIS W/CONTRAST: CPT

## 2025-08-28 PROCEDURE — 99232 SBSQ HOSP IP/OBS MODERATE 35: CPT

## 2025-08-28 PROCEDURE — 82330 ASSAY OF CALCIUM: CPT

## 2025-08-28 PROCEDURE — 93975 VASCULAR STUDY: CPT

## 2025-08-28 PROCEDURE — 82947 ASSAY GLUCOSE BLOOD QUANT: CPT

## 2025-08-28 PROCEDURE — 82803 BLOOD GASES ANY COMBINATION: CPT

## 2025-08-28 PROCEDURE — 80053 COMPREHEN METABOLIC PANEL: CPT

## 2025-08-28 PROCEDURE — 85018 HEMOGLOBIN: CPT

## 2025-08-28 PROCEDURE — 84295 ASSAY OF SERUM SODIUM: CPT

## 2025-08-28 PROCEDURE — 82435 ASSAY OF BLOOD CHLORIDE: CPT

## 2025-08-28 PROCEDURE — 83690 ASSAY OF LIPASE: CPT

## 2025-08-28 PROCEDURE — 99233 SBSQ HOSP IP/OBS HIGH 50: CPT

## 2025-08-28 PROCEDURE — 82962 GLUCOSE BLOOD TEST: CPT

## 2025-08-28 PROCEDURE — 85025 COMPLETE CBC W/AUTO DIFF WBC: CPT

## 2025-08-28 PROCEDURE — 85014 HEMATOCRIT: CPT

## 2025-08-28 PROCEDURE — 81001 URINALYSIS AUTO W/SCOPE: CPT

## 2025-08-28 PROCEDURE — 76830 TRANSVAGINAL US NON-OB: CPT

## 2025-08-28 PROCEDURE — 84132 ASSAY OF SERUM POTASSIUM: CPT

## 2025-08-28 PROCEDURE — 83036 HEMOGLOBIN GLYCOSYLATED A1C: CPT

## 2025-08-28 PROCEDURE — 84100 ASSAY OF PHOSPHORUS: CPT

## 2025-08-28 PROCEDURE — 83735 ASSAY OF MAGNESIUM: CPT

## 2025-08-28 PROCEDURE — 76856 US EXAM PELVIC COMPLETE: CPT

## 2025-08-28 PROCEDURE — 87086 URINE CULTURE/COLONY COUNT: CPT

## 2025-08-28 PROCEDURE — 83605 ASSAY OF LACTIC ACID: CPT

## 2025-08-28 PROCEDURE — 76705 ECHO EXAM OF ABDOMEN: CPT

## 2025-08-28 RX ADMIN — Medication 10 MILLIGRAM(S): at 21:19

## 2025-08-28 RX ADMIN — Medication 90 MICROGRAM(S): at 21:19

## 2025-08-28 RX ADMIN — Medication 10 MILLIGRAM(S): at 08:47

## 2025-08-28 RX ADMIN — Medication 25 GRAM(S): at 17:42

## 2025-08-28 RX ADMIN — MONTELUKAST SODIUM 10 MILLIGRAM(S): 10 TABLET ORAL at 08:47

## 2025-08-28 RX ADMIN — Medication 10 MILLIGRAM(S): at 13:02

## 2025-08-28 RX ADMIN — Medication 100 MILLIEQUIVALENT(S): at 14:20

## 2025-08-28 RX ADMIN — Medication 4 MILLIGRAM(S): at 17:34

## 2025-08-28 RX ADMIN — Medication 100 MILLIEQUIVALENT(S): at 13:25

## 2025-08-28 RX ADMIN — Medication 4 MILLIGRAM(S): at 02:50

## 2025-08-28 RX ADMIN — Medication 100 MILLIEQUIVALENT(S): at 12:21

## 2025-08-28 RX ADMIN — Medication 25 GRAM(S): at 02:06

## 2025-08-28 RX ADMIN — Medication 25 GRAM(S): at 10:31

## 2025-08-28 RX ADMIN — Medication 10 MILLIGRAM(S): at 05:24

## 2025-08-28 RX ADMIN — SODIUM CHLORIDE 75 MILLILITER(S): 9 INJECTION, SOLUTION INTRAVENOUS at 08:48

## 2025-08-29 LAB
ANION GAP SERPL CALC-SCNC: 19 MMOL/L — HIGH (ref 5–17)
BUN SERPL-MCNC: 4 MG/DL — LOW (ref 7–23)
CALCIUM SERPL-MCNC: 8.6 MG/DL — SIGNIFICANT CHANGE UP (ref 8.4–10.5)
CHLORIDE SERPL-SCNC: 88 MMOL/L — LOW (ref 96–108)
CO2 SERPL-SCNC: 20 MMOL/L — LOW (ref 22–31)
CREAT SERPL-MCNC: 0.53 MG/DL — SIGNIFICANT CHANGE UP (ref 0.5–1.3)
EGFR: 109 ML/MIN/1.73M2 — SIGNIFICANT CHANGE UP
EGFR: 109 ML/MIN/1.73M2 — SIGNIFICANT CHANGE UP
GLUCOSE BLDC GLUCOMTR-MCNC: 103 MG/DL — HIGH (ref 70–99)
GLUCOSE BLDC GLUCOMTR-MCNC: 105 MG/DL — HIGH (ref 70–99)
GLUCOSE BLDC GLUCOMTR-MCNC: 124 MG/DL — HIGH (ref 70–99)
GLUCOSE BLDC GLUCOMTR-MCNC: 94 MG/DL — SIGNIFICANT CHANGE UP (ref 70–99)
GLUCOSE SERPL-MCNC: 98 MG/DL — SIGNIFICANT CHANGE UP (ref 70–99)
HCT VFR BLD CALC: 35.4 % — SIGNIFICANT CHANGE UP (ref 34.5–45)
HGB BLD-MCNC: 12 G/DL — SIGNIFICANT CHANGE UP (ref 11.5–15.5)
MAGNESIUM SERPL-MCNC: 1.7 MG/DL — SIGNIFICANT CHANGE UP (ref 1.6–2.6)
MCHC RBC-ENTMCNC: 28.2 PG — SIGNIFICANT CHANGE UP (ref 27–34)
MCHC RBC-ENTMCNC: 33.9 G/DL — SIGNIFICANT CHANGE UP (ref 32–36)
MCV RBC AUTO: 83.1 FL — SIGNIFICANT CHANGE UP (ref 80–100)
NRBC # BLD AUTO: 0 K/UL — SIGNIFICANT CHANGE UP (ref 0–0)
NRBC # FLD: 0 K/UL — SIGNIFICANT CHANGE UP (ref 0–0)
NRBC BLD AUTO-RTO: 0 /100 WBCS — SIGNIFICANT CHANGE UP (ref 0–0)
PLATELET # BLD AUTO: 515 K/UL — HIGH (ref 150–400)
PMV BLD: 9.3 FL — SIGNIFICANT CHANGE UP (ref 7–13)
POTASSIUM SERPL-MCNC: 3.3 MMOL/L — LOW (ref 3.5–5.3)
POTASSIUM SERPL-SCNC: 3.3 MMOL/L — LOW (ref 3.5–5.3)
RBC # BLD: 4.26 M/UL — SIGNIFICANT CHANGE UP (ref 3.8–5.2)
RBC # FLD: 12.1 % — SIGNIFICANT CHANGE UP (ref 10.3–14.5)
SODIUM SERPL-SCNC: 127 MMOL/L — LOW (ref 135–145)
WBC # BLD: 14.2 K/UL — HIGH (ref 3.8–10.5)
WBC # FLD AUTO: 14.2 K/UL — HIGH (ref 3.8–10.5)

## 2025-08-29 PROCEDURE — 99232 SBSQ HOSP IP/OBS MODERATE 35: CPT

## 2025-08-29 PROCEDURE — 93010 ELECTROCARDIOGRAM REPORT: CPT

## 2025-08-29 PROCEDURE — 99233 SBSQ HOSP IP/OBS HIGH 50: CPT

## 2025-08-29 RX ORDER — MAGNESIUM SULFATE 500 MG/ML
1 SYRINGE (ML) INJECTION ONCE
Refills: 0 | Status: COMPLETED | OUTPATIENT
Start: 2025-08-29 | End: 2025-08-29

## 2025-08-29 RX ORDER — OLANZAPINE 10 MG/1
2.5 TABLET ORAL EVERY 6 HOURS
Refills: 0 | Status: DISCONTINUED | OUTPATIENT
Start: 2025-08-29 | End: 2025-08-30

## 2025-08-29 RX ORDER — ERTAPENEM SODIUM 1 G/1
1000 INJECTION, POWDER, LYOPHILIZED, FOR SOLUTION INTRAMUSCULAR; INTRAVENOUS EVERY 24 HOURS
Refills: 0 | Status: DISCONTINUED | OUTPATIENT
Start: 2025-08-29 | End: 2025-08-30

## 2025-08-29 RX ADMIN — Medication 100 MILLIEQUIVALENT(S): at 11:13

## 2025-08-29 RX ADMIN — Medication 25 GRAM(S): at 01:07

## 2025-08-29 RX ADMIN — Medication 400 MILLIGRAM(S): at 13:33

## 2025-08-29 RX ADMIN — Medication 10 MILLIGRAM(S): at 05:43

## 2025-08-29 RX ADMIN — Medication 25 GRAM(S): at 09:09

## 2025-08-29 RX ADMIN — Medication 100 MILLIEQUIVALENT(S): at 10:09

## 2025-08-29 RX ADMIN — Medication 90 MICROGRAM(S): at 21:43

## 2025-08-29 RX ADMIN — Medication 10 MILLIGRAM(S): at 09:10

## 2025-08-29 RX ADMIN — Medication 100 MILLIEQUIVALENT(S): at 09:10

## 2025-08-29 RX ADMIN — MONTELUKAST SODIUM 10 MILLIGRAM(S): 10 TABLET ORAL at 09:10

## 2025-08-29 RX ADMIN — SODIUM CHLORIDE 75 MILLILITER(S): 9 INJECTION, SOLUTION INTRAVENOUS at 01:09

## 2025-08-29 RX ADMIN — ERTAPENEM SODIUM 100 MILLIGRAM(S): 1 INJECTION, POWDER, LYOPHILIZED, FOR SOLUTION INTRAMUSCULAR; INTRAVENOUS at 16:52

## 2025-08-29 RX ADMIN — Medication 400 MILLIGRAM(S): at 21:43

## 2025-08-29 RX ADMIN — Medication 100 GRAM(S): at 15:46

## 2025-08-30 ENCOUNTER — TRANSCRIPTION ENCOUNTER (OUTPATIENT)
Age: 55
End: 2025-08-30

## 2025-08-30 VITALS
TEMPERATURE: 99 F | HEART RATE: 84 BPM | DIASTOLIC BLOOD PRESSURE: 83 MMHG | RESPIRATION RATE: 18 BRPM | SYSTOLIC BLOOD PRESSURE: 137 MMHG | OXYGEN SATURATION: 98 %

## 2025-08-30 LAB
ANION GAP SERPL CALC-SCNC: 17 MMOL/L — SIGNIFICANT CHANGE UP (ref 5–17)
ANION GAP SERPL CALC-SCNC: 20 MMOL/L — HIGH (ref 5–17)
BUN SERPL-MCNC: 5 MG/DL — LOW (ref 7–23)
BUN SERPL-MCNC: 6 MG/DL — LOW (ref 7–23)
CALCIUM SERPL-MCNC: 8.5 MG/DL — SIGNIFICANT CHANGE UP (ref 8.4–10.5)
CALCIUM SERPL-MCNC: 8.7 MG/DL — SIGNIFICANT CHANGE UP (ref 8.4–10.5)
CHLORIDE SERPL-SCNC: 88 MMOL/L — LOW (ref 96–108)
CHLORIDE SERPL-SCNC: 90 MMOL/L — LOW (ref 96–108)
CO2 SERPL-SCNC: 20 MMOL/L — LOW (ref 22–31)
CO2 SERPL-SCNC: 21 MMOL/L — LOW (ref 22–31)
CREAT SERPL-MCNC: 0.49 MG/DL — LOW (ref 0.5–1.3)
CREAT SERPL-MCNC: 0.55 MG/DL — SIGNIFICANT CHANGE UP (ref 0.5–1.3)
EGFR: 108 ML/MIN/1.73M2 — SIGNIFICANT CHANGE UP
EGFR: 108 ML/MIN/1.73M2 — SIGNIFICANT CHANGE UP
EGFR: 111 ML/MIN/1.73M2 — SIGNIFICANT CHANGE UP
EGFR: 111 ML/MIN/1.73M2 — SIGNIFICANT CHANGE UP
GLUCOSE BLDC GLUCOMTR-MCNC: 84 MG/DL — SIGNIFICANT CHANGE UP (ref 70–99)
GLUCOSE BLDC GLUCOMTR-MCNC: 92 MG/DL — SIGNIFICANT CHANGE UP (ref 70–99)
GLUCOSE BLDC GLUCOMTR-MCNC: 95 MG/DL — SIGNIFICANT CHANGE UP (ref 70–99)
GLUCOSE SERPL-MCNC: 84 MG/DL — SIGNIFICANT CHANGE UP (ref 70–99)
GLUCOSE SERPL-MCNC: 92 MG/DL — SIGNIFICANT CHANGE UP (ref 70–99)
HCT VFR BLD CALC: 39.2 % — SIGNIFICANT CHANGE UP (ref 34.5–45)
HGB BLD-MCNC: 13.2 G/DL — SIGNIFICANT CHANGE UP (ref 11.5–15.5)
MAGNESIUM SERPL-MCNC: 2.2 MG/DL — SIGNIFICANT CHANGE UP (ref 1.6–2.6)
MCHC RBC-ENTMCNC: 28 PG — SIGNIFICANT CHANGE UP (ref 27–34)
MCHC RBC-ENTMCNC: 33.7 G/DL — SIGNIFICANT CHANGE UP (ref 32–36)
MCV RBC AUTO: 83.2 FL — SIGNIFICANT CHANGE UP (ref 80–100)
MRSA PCR RESULT.: SIGNIFICANT CHANGE UP
NRBC # BLD AUTO: 0 K/UL — SIGNIFICANT CHANGE UP (ref 0–0)
NRBC # FLD: 0 K/UL — SIGNIFICANT CHANGE UP (ref 0–0)
NRBC BLD AUTO-RTO: 0 /100 WBCS — SIGNIFICANT CHANGE UP (ref 0–0)
OSMOLALITY UR: 395 MOS/KG — SIGNIFICANT CHANGE UP (ref 300–900)
PHOSPHATE SERPL-MCNC: 2.5 MG/DL — SIGNIFICANT CHANGE UP (ref 2.5–4.5)
PLATELET # BLD AUTO: 576 K/UL — HIGH (ref 150–400)
PMV BLD: 9.2 FL — SIGNIFICANT CHANGE UP (ref 7–13)
POTASSIUM SERPL-MCNC: 3.3 MMOL/L — LOW (ref 3.5–5.3)
POTASSIUM SERPL-MCNC: 4 MMOL/L — SIGNIFICANT CHANGE UP (ref 3.5–5.3)
POTASSIUM SERPL-SCNC: 3.3 MMOL/L — LOW (ref 3.5–5.3)
POTASSIUM SERPL-SCNC: 4 MMOL/L — SIGNIFICANT CHANGE UP (ref 3.5–5.3)
RBC # BLD: 4.71 M/UL — SIGNIFICANT CHANGE UP (ref 3.8–5.2)
RBC # FLD: 12.1 % — SIGNIFICANT CHANGE UP (ref 10.3–14.5)
S AUREUS DNA NOSE QL NAA+PROBE: SIGNIFICANT CHANGE UP
SODIUM SERPL-SCNC: 128 MMOL/L — LOW (ref 135–145)
SODIUM SERPL-SCNC: 128 MMOL/L — LOW (ref 135–145)
SODIUM UR-SCNC: 106 MMOL/L — SIGNIFICANT CHANGE UP
WBC # BLD: 15.28 K/UL — HIGH (ref 3.8–10.5)
WBC # FLD AUTO: 15.28 K/UL — HIGH (ref 3.8–10.5)

## 2025-08-30 PROCEDURE — 82435 ASSAY OF BLOOD CHLORIDE: CPT

## 2025-08-30 PROCEDURE — 82947 ASSAY GLUCOSE BLOOD QUANT: CPT

## 2025-08-30 PROCEDURE — 83735 ASSAY OF MAGNESIUM: CPT

## 2025-08-30 PROCEDURE — 85018 HEMOGLOBIN: CPT

## 2025-08-30 PROCEDURE — 83605 ASSAY OF LACTIC ACID: CPT

## 2025-08-30 PROCEDURE — 87641 MR-STAPH DNA AMP PROBE: CPT

## 2025-08-30 PROCEDURE — 80048 BASIC METABOLIC PNL TOTAL CA: CPT

## 2025-08-30 PROCEDURE — 84100 ASSAY OF PHOSPHORUS: CPT

## 2025-08-30 PROCEDURE — 87086 URINE CULTURE/COLONY COUNT: CPT

## 2025-08-30 PROCEDURE — 82803 BLOOD GASES ANY COMBINATION: CPT

## 2025-08-30 PROCEDURE — 74177 CT ABD & PELVIS W/CONTRAST: CPT

## 2025-08-30 PROCEDURE — 85027 COMPLETE CBC AUTOMATED: CPT

## 2025-08-30 PROCEDURE — 76830 TRANSVAGINAL US NON-OB: CPT

## 2025-08-30 PROCEDURE — 93005 ELECTROCARDIOGRAM TRACING: CPT

## 2025-08-30 PROCEDURE — 85025 COMPLETE CBC W/AUTO DIFF WBC: CPT

## 2025-08-30 PROCEDURE — 84295 ASSAY OF SERUM SODIUM: CPT

## 2025-08-30 PROCEDURE — 96375 TX/PRO/DX INJ NEW DRUG ADDON: CPT

## 2025-08-30 PROCEDURE — 82962 GLUCOSE BLOOD TEST: CPT

## 2025-08-30 PROCEDURE — 83690 ASSAY OF LIPASE: CPT

## 2025-08-30 PROCEDURE — 76856 US EXAM PELVIC COMPLETE: CPT

## 2025-08-30 PROCEDURE — 93975 VASCULAR STUDY: CPT

## 2025-08-30 PROCEDURE — 84300 ASSAY OF URINE SODIUM: CPT

## 2025-08-30 PROCEDURE — 76705 ECHO EXAM OF ABDOMEN: CPT

## 2025-08-30 PROCEDURE — 85014 HEMATOCRIT: CPT

## 2025-08-30 PROCEDURE — 96374 THER/PROPH/DIAG INJ IV PUSH: CPT

## 2025-08-30 PROCEDURE — 99232 SBSQ HOSP IP/OBS MODERATE 35: CPT

## 2025-08-30 PROCEDURE — 82330 ASSAY OF CALCIUM: CPT

## 2025-08-30 PROCEDURE — 36569 INSJ PICC 5 YR+ W/O IMAGING: CPT

## 2025-08-30 PROCEDURE — 84132 ASSAY OF SERUM POTASSIUM: CPT

## 2025-08-30 PROCEDURE — C1751: CPT

## 2025-08-30 PROCEDURE — 99285 EMERGENCY DEPT VISIT HI MDM: CPT

## 2025-08-30 PROCEDURE — 87640 STAPH A DNA AMP PROBE: CPT

## 2025-08-30 PROCEDURE — 81001 URINALYSIS AUTO W/SCOPE: CPT

## 2025-08-30 PROCEDURE — 80053 COMPREHEN METABOLIC PANEL: CPT

## 2025-08-30 PROCEDURE — 83036 HEMOGLOBIN GLYCOSYLATED A1C: CPT

## 2025-08-30 PROCEDURE — 83935 ASSAY OF URINE OSMOLALITY: CPT

## 2025-08-30 RX ORDER — CLINDAMYCIN PHOSPHATE 150 MG/ML
3 VIAL (ML) INJECTION
Refills: 0 | DISCHARGE

## 2025-08-30 RX ORDER — TIRZEPATIDE 7.5 MG/.5ML
15 INJECTION, SOLUTION SUBCUTANEOUS
Refills: 0 | DISCHARGE

## 2025-08-30 RX ORDER — ACETAMINOPHEN 500 MG/5ML
2 LIQUID (ML) ORAL
Qty: 0 | Refills: 0 | DISCHARGE
Start: 2025-08-30

## 2025-08-30 RX ORDER — LEVOCETIRIZINE DIHYDROCHLORIDE 5 MG/1
1 TABLET ORAL
Refills: 0 | DISCHARGE

## 2025-08-30 RX ADMIN — Medication 40 MILLIEQUIVALENT(S): at 09:35

## 2025-08-30 RX ADMIN — Medication 500 MILLILITER(S): at 14:07

## 2025-08-30 RX ADMIN — Medication 400 MILLIGRAM(S): at 05:04

## 2025-08-30 RX ADMIN — Medication 400 MILLIGRAM(S): at 14:08

## 2025-08-30 RX ADMIN — MONTELUKAST SODIUM 10 MILLIGRAM(S): 10 TABLET ORAL at 11:41

## 2025-08-30 RX ADMIN — ERTAPENEM SODIUM 100 MILLIGRAM(S): 1 INJECTION, POWDER, LYOPHILIZED, FOR SOLUTION INTRAMUSCULAR; INTRAVENOUS at 16:35

## 2025-08-30 RX ADMIN — Medication 1 APPLICATION(S): at 11:42

## 2025-08-30 RX ADMIN — Medication 1 GRAM(S): at 18:01

## 2025-08-30 RX ADMIN — Medication 10 MILLIGRAM(S): at 11:41

## 2025-09-02 ENCOUNTER — NON-APPOINTMENT (OUTPATIENT)
Age: 55
End: 2025-09-02

## 2025-09-04 ENCOUNTER — RX RENEWAL (OUTPATIENT)
Age: 55
End: 2025-09-04

## 2025-09-05 ENCOUNTER — NON-APPOINTMENT (OUTPATIENT)
Age: 55
End: 2025-09-05

## 2025-09-05 PROBLEM — N73.9 PELVIC ABSCESS IN FEMALE: Status: ACTIVE | Noted: 2025-09-05

## 2025-09-08 ENCOUNTER — APPOINTMENT (OUTPATIENT)
Dept: ENDOCRINOLOGY | Facility: CLINIC | Age: 55
End: 2025-09-08
Payer: COMMERCIAL

## 2025-09-08 ENCOUNTER — RESULT REVIEW (OUTPATIENT)
Age: 55
End: 2025-09-08

## 2025-09-08 ENCOUNTER — APPOINTMENT (OUTPATIENT)
Dept: ULTRASOUND IMAGING | Facility: CLINIC | Age: 55
End: 2025-09-08
Payer: COMMERCIAL

## 2025-09-08 DIAGNOSIS — E89.0 POSTPROCEDURAL HYPOTHYROIDISM: ICD-10-CM

## 2025-09-08 DIAGNOSIS — R73.03 PREDIABETES.: ICD-10-CM

## 2025-09-08 DIAGNOSIS — N73.9 FEMALE PELVIC INFLAMMATORY DISEASE, UNSPECIFIED: ICD-10-CM

## 2025-09-08 DIAGNOSIS — E66.9 OBESITY, UNSPECIFIED: ICD-10-CM

## 2025-09-08 DIAGNOSIS — C73 MALIGNANT NEOPLASM OF THYROID GLAND: ICD-10-CM

## 2025-09-08 DIAGNOSIS — E11.9 TYPE 2 DIABETES MELLITUS W/OUT COMPLICATIONS: ICD-10-CM

## 2025-09-08 DIAGNOSIS — E05.00 THYROTOXICOSIS WITH DIFFUSE GOITER W/OUT THYROTOXIC CRISIS OR STORM: ICD-10-CM

## 2025-09-08 PROCEDURE — 76857 US EXAM PELVIC LIMITED: CPT

## 2025-09-08 PROCEDURE — G2211 COMPLEX E/M VISIT ADD ON: CPT | Mod: NC,95

## 2025-09-08 PROCEDURE — 76830 TRANSVAGINAL US NON-OB: CPT

## 2025-09-08 PROCEDURE — 99215 OFFICE O/P EST HI 40 MIN: CPT | Mod: 95

## 2025-09-08 RX ORDER — LANCING DEVICE
EACH MISCELLANEOUS
Qty: 1 | Refills: 1 | Status: ACTIVE | COMMUNITY
Start: 2025-09-08 | End: 1900-01-01

## 2025-09-08 RX ORDER — LANCING DEVICE
EACH MISCELLANEOUS DAILY
Qty: 2 | Refills: 3 | Status: ACTIVE | COMMUNITY
Start: 2025-09-08 | End: 1900-01-01

## 2025-09-08 RX ORDER — CHOLECALCIFEROL (VITAMIN D3) 10(400)/ML
DROPS ORAL
Qty: 1 | Refills: 2 | Status: ACTIVE | COMMUNITY
Start: 2025-09-08 | End: 1900-01-01

## 2025-09-11 ENCOUNTER — NON-APPOINTMENT (OUTPATIENT)
Age: 55
End: 2025-09-11

## 2025-09-15 ENCOUNTER — APPOINTMENT (OUTPATIENT)
Dept: INFECTIOUS DISEASE | Facility: CLINIC | Age: 55
End: 2025-09-15
Payer: COMMERCIAL

## 2025-09-15 VITALS
TEMPERATURE: 98.1 F | WEIGHT: 126 LBS | SYSTOLIC BLOOD PRESSURE: 139 MMHG | HEIGHT: 63 IN | BODY MASS INDEX: 22.32 KG/M2 | DIASTOLIC BLOOD PRESSURE: 80 MMHG | HEART RATE: 79 BPM | OXYGEN SATURATION: 97 %

## 2025-09-15 PROCEDURE — 99213 OFFICE O/P EST LOW 20 MIN: CPT

## (undated) DEVICE — TUBING DEPUY MITEK FMS OUTFLOW

## (undated) DEVICE — LUBRICATING JELLY HR ONE SHOT 3G

## (undated) DEVICE — TUBING MEDI-VAC W MAXIGRIP CONNECTORS 1/4"X6'

## (undated) DEVICE — VENODYNE/SCD SLEEVE CALF MEDIUM

## (undated) DEVICE — CATH IV SAFE BC 22G X 1" (BLUE)

## (undated) DEVICE — DRSG TAPE MICROFOAM 4"

## (undated) DEVICE — BIOPSY FORCEP COLD DISP

## (undated) DEVICE — SHAVER BLADE S&N FULL RADIUS 3.5MM STRAIGHT (BEIGE)

## (undated) DEVICE — PACK SHOULDER

## (undated) DEVICE — S&N ARTHROCARE WAND TURBOVAC 90 DEGREE

## (undated) DEVICE — CANNULA S&N ARTHROSCOPY W OBTURATOR 5MM

## (undated) DEVICE — DRSG CURITY GAUZE SPONGE 4 X 4" 12-PLY NON-STERILE

## (undated) DEVICE — CONTAINER FORMALIN 10% 20ML

## (undated) DEVICE — BASIN EMESIS 10IN GRADUATED MAUVE

## (undated) DEVICE — ELCTR ECG CONDUCTIVE ADHESIVE

## (undated) DEVICE — SNARE EXACTO COLD 9MMX230CM

## (undated) DEVICE — TUBING DEPUY MITEK FMS INFLOW

## (undated) DEVICE — POSITIONING ARTHREX TRIMANO BEACH CHAIR

## (undated) DEVICE — CONTAINER FORMALIN 80ML YELLOW

## (undated) DEVICE — POSITIONER STRAP ARMBOARD VELCRO TS-30

## (undated) DEVICE — BIOPSY FORCEP RADIAL JAW 4 STANDARD WITH NEEDLE

## (undated) DEVICE — SHAVER BLADE S&N FULL RADIUS 5.5MM STRAIGHT (ORANGE)

## (undated) DEVICE — SALIVA EJECTOR (BLUE)

## (undated) DEVICE — DRAPE IOBAN 23" X 23"

## (undated) DEVICE — CANNULA S&N ARTHROSCOPY W OBTURATOR 8MM

## (undated) DEVICE — PACK IV START WITH CHG

## (undated) DEVICE — CANNULA S&N ARTHROSCOPY W OBTURATOR 5.5MM

## (undated) DEVICE — TUBING SUCTION NONCONDUCTIVE 6MM X 12FT

## (undated) DEVICE — GOWN LG

## (undated) DEVICE — SUT ETHILON 3-0 18" FS-1

## (undated) DEVICE — TUBING IV SET GRAVITY 3Y 100" MACRO

## (undated) DEVICE — WARMING BLANKET LOWER ADULT

## (undated) DEVICE — DRSG 2X2

## (undated) DEVICE — POSITIONER SHOULDER ARTHROSCOPY SUSPENSION KIT

## (undated) DEVICE — ELCTR GROUNDING PAD ADULT COVIDIEN

## (undated) DEVICE — DRSG BANDAID 0.75X3"

## (undated) DEVICE — GLV 8 PROTEXIS (WHITE)

## (undated) DEVICE — POLY TRAP ETRAP

## (undated) DEVICE — SOL IRR BAG NS 0.9% 3000ML